# Patient Record
Sex: FEMALE | Race: BLACK OR AFRICAN AMERICAN | Employment: UNEMPLOYED | URBAN - METROPOLITAN AREA
[De-identification: names, ages, dates, MRNs, and addresses within clinical notes are randomized per-mention and may not be internally consistent; named-entity substitution may affect disease eponyms.]

---

## 2023-03-03 ENCOUNTER — APPOINTMENT (OUTPATIENT)
Dept: GENERAL RADIOLOGY | Age: 73
DRG: 502 | End: 2023-03-03
Payer: MEDICARE

## 2023-03-03 ENCOUNTER — HOSPITAL ENCOUNTER (INPATIENT)
Age: 73
LOS: 5 days | Discharge: NURSING, REHAB OR LONG-TERM CARE | DRG: 502 | End: 2023-03-08
Attending: EMERGENCY MEDICINE | Admitting: PEDIATRICS
Payer: MEDICARE

## 2023-03-03 DIAGNOSIS — S89.91XA INJURY OF RIGHT LOWER EXTREMITY, INITIAL ENCOUNTER: ICD-10-CM
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
## 2023-03-03 DIAGNOSIS — S76.111A QUADRICEPS TENDON RUPTURE, RIGHT, INITIAL ENCOUNTER: ICD-10-CM

## 2023-03-03 PROBLEM — M79.606 LEG PAIN: Status: ACTIVE | Noted: 2023-03-03

## 2023-03-03 LAB
A/G RATIO: 1.5 (ref 1.1–2.2)
ALBUMIN SERPL-MCNC: 4.5 G/DL (ref 3.4–5)
ALP BLD-CCNC: 72 U/L (ref 40–129)
ALT SERPL-CCNC: 12 U/L (ref 10–40)
ANION GAP SERPL CALCULATED.3IONS-SCNC: 10 MMOL/L (ref 3–16)
AST SERPL-CCNC: 25 U/L (ref 15–37)
BASOPHILS ABSOLUTE: 0 K/UL (ref 0–0.2)
BASOPHILS RELATIVE PERCENT: 0.2 %
BILIRUB SERPL-MCNC: 0.4 MG/DL (ref 0–1)
BUN BLDV-MCNC: 15 MG/DL (ref 7–20)
CALCIUM SERPL-MCNC: 9.8 MG/DL (ref 8.3–10.6)
CHLORIDE BLD-SCNC: 99 MMOL/L (ref 99–110)
CO2: 28 MMOL/L (ref 21–32)
CREAT SERPL-MCNC: 0.7 MG/DL (ref 0.6–1.2)
EOSINOPHILS ABSOLUTE: 0 K/UL (ref 0–0.6)
EOSINOPHILS RELATIVE PERCENT: 0.1 %
GFR SERPL CREATININE-BSD FRML MDRD: >60 ML/MIN/{1.73_M2}
GLUCOSE BLD-MCNC: 116 MG/DL (ref 70–99)
HCT VFR BLD CALC: 42 % (ref 36–48)
HEMOGLOBIN: 13.8 G/DL (ref 12–16)
LYMPHOCYTES ABSOLUTE: 0.9 K/UL (ref 1–5.1)
LYMPHOCYTES RELATIVE PERCENT: 6.4 %
MCH RBC QN AUTO: 29.7 PG (ref 26–34)
MCHC RBC AUTO-ENTMCNC: 32.9 G/DL (ref 31–36)
MCV RBC AUTO: 90.4 FL (ref 80–100)
MONOCYTES ABSOLUTE: 0.6 K/UL (ref 0–1.3)
MONOCYTES RELATIVE PERCENT: 3.9 %
NEUTROPHILS ABSOLUTE: 13 K/UL (ref 1.7–7.7)
NEUTROPHILS RELATIVE PERCENT: 89.4 %
PDW BLD-RTO: 14.4 % (ref 12.4–15.4)
PLATELET # BLD: 275 K/UL (ref 135–450)
PMV BLD AUTO: 7.8 FL (ref 5–10.5)
POTASSIUM REFLEX MAGNESIUM: 4 MMOL/L (ref 3.5–5.1)
RBC # BLD: 4.64 M/UL (ref 4–5.2)
SODIUM BLD-SCNC: 137 MMOL/L (ref 136–145)
TOTAL PROTEIN: 7.6 G/DL (ref 6.4–8.2)
WBC # BLD: 14.6 K/UL (ref 4–11)

## 2023-03-03 PROCEDURE — 36415 COLL VENOUS BLD VENIPUNCTURE: CPT

## 2023-03-03 PROCEDURE — 99285 EMERGENCY DEPT VISIT HI MDM: CPT

## 2023-03-03 PROCEDURE — 80053 COMPREHEN METABOLIC PANEL: CPT

## 2023-03-03 PROCEDURE — 6370000000 HC RX 637 (ALT 250 FOR IP): Performed by: EMERGENCY MEDICINE

## 2023-03-03 PROCEDURE — 1200000000 HC SEMI PRIVATE

## 2023-03-03 PROCEDURE — 85025 COMPLETE CBC W/AUTO DIFF WBC: CPT

## 2023-03-03 PROCEDURE — 73552 X-RAY EXAM OF FEMUR 2/>: CPT

## 2023-03-03 RX ORDER — SODIUM CHLORIDE 9 MG/ML
INJECTION, SOLUTION INTRAVENOUS PRN
Status: DISCONTINUED | OUTPATIENT
Start: 2023-03-03 | End: 2023-03-08 | Stop reason: HOSPADM

## 2023-03-03 RX ORDER — ACETAMINOPHEN 500 MG
1000 TABLET ORAL ONCE
Status: COMPLETED | OUTPATIENT
Start: 2023-03-03 | End: 2023-03-03

## 2023-03-03 RX ORDER — ENOXAPARIN SODIUM 100 MG/ML
40 INJECTION SUBCUTANEOUS DAILY
Status: DISCONTINUED | OUTPATIENT
Start: 2023-03-04 | End: 2023-03-04

## 2023-03-03 RX ORDER — OXYCODONE HYDROCHLORIDE 5 MG/1
5 TABLET ORAL EVERY 4 HOURS PRN
Status: DISCONTINUED | OUTPATIENT
Start: 2023-03-03 | End: 2023-03-08 | Stop reason: HOSPADM

## 2023-03-03 RX ORDER — SODIUM CHLORIDE 0.9 % (FLUSH) 0.9 %
5-40 SYRINGE (ML) INJECTION PRN
Status: DISCONTINUED | OUTPATIENT
Start: 2023-03-03 | End: 2023-03-08 | Stop reason: HOSPADM

## 2023-03-03 RX ORDER — ACETAMINOPHEN 325 MG/1
650 TABLET ORAL EVERY 6 HOURS PRN
Status: DISCONTINUED | OUTPATIENT
Start: 2023-03-03 | End: 2023-03-04

## 2023-03-03 RX ORDER — POLYETHYLENE GLYCOL 3350 17 G/17G
17 POWDER, FOR SOLUTION ORAL DAILY PRN
Status: DISCONTINUED | OUTPATIENT
Start: 2023-03-03 | End: 2023-03-08 | Stop reason: HOSPADM

## 2023-03-03 RX ORDER — ONDANSETRON 4 MG/1
4 TABLET, ORALLY DISINTEGRATING ORAL EVERY 8 HOURS PRN
Status: DISCONTINUED | OUTPATIENT
Start: 2023-03-03 | End: 2023-03-08 | Stop reason: HOSPADM

## 2023-03-03 RX ORDER — ONDANSETRON 2 MG/ML
4 INJECTION INTRAMUSCULAR; INTRAVENOUS EVERY 6 HOURS PRN
Status: DISCONTINUED | OUTPATIENT
Start: 2023-03-03 | End: 2023-03-08 | Stop reason: HOSPADM

## 2023-03-03 RX ORDER — SODIUM CHLORIDE 0.9 % (FLUSH) 0.9 %
5-40 SYRINGE (ML) INJECTION EVERY 12 HOURS SCHEDULED
Status: DISCONTINUED | OUTPATIENT
Start: 2023-03-04 | End: 2023-03-08 | Stop reason: HOSPADM

## 2023-03-03 RX ORDER — ACETAMINOPHEN 650 MG/1
650 SUPPOSITORY RECTAL EVERY 6 HOURS PRN
Status: DISCONTINUED | OUTPATIENT
Start: 2023-03-03 | End: 2023-03-04

## 2023-03-03 RX ADMIN — ACETAMINOPHEN 1000 MG: 500 TABLET ORAL at 21:08

## 2023-03-03 RX ADMIN — IBUPROFEN 600 MG: 400 TABLET ORAL at 21:09

## 2023-03-03 ASSESSMENT — ENCOUNTER SYMPTOMS
EYE PAIN: 0
COUGH: 0
WHEEZING: 0
SHORTNESS OF BREATH: 0
DIARRHEA: 0
BACK PAIN: 0
RHINORRHEA: 0
ABDOMINAL PAIN: 0
EYE DISCHARGE: 0
SORE THROAT: 0
VOMITING: 0
NAUSEA: 0

## 2023-03-03 ASSESSMENT — PAIN SCALES - GENERAL
PAINLEVEL_OUTOF10: 9
PAINLEVEL_OUTOF10: 9

## 2023-03-04 ENCOUNTER — APPOINTMENT (OUTPATIENT)
Dept: CT IMAGING | Age: 73
DRG: 502 | End: 2023-03-04
Payer: MEDICARE

## 2023-03-04 PROBLEM — S76.111A QUADRICEPS TENDON RUPTURE, RIGHT, INITIAL ENCOUNTER: Status: ACTIVE | Noted: 2023-03-04

## 2023-03-04 PROCEDURE — 94760 N-INVAS EAR/PLS OXIMETRY 1: CPT

## 2023-03-04 PROCEDURE — 6370000000 HC RX 637 (ALT 250 FOR IP): Performed by: INTERNAL MEDICINE

## 2023-03-04 PROCEDURE — 6370000000 HC RX 637 (ALT 250 FOR IP): Performed by: PEDIATRICS

## 2023-03-04 PROCEDURE — 73700 CT LOWER EXTREMITY W/O DYE: CPT

## 2023-03-04 PROCEDURE — 6360000002 HC RX W HCPCS: Performed by: PEDIATRICS

## 2023-03-04 PROCEDURE — 70450 CT HEAD/BRAIN W/O DYE: CPT

## 2023-03-04 PROCEDURE — 1200000000 HC SEMI PRIVATE

## 2023-03-04 PROCEDURE — 2580000003 HC RX 258: Performed by: PEDIATRICS

## 2023-03-04 RX ORDER — ACETAMINOPHEN 500 MG
1000 TABLET ORAL EVERY 8 HOURS SCHEDULED
Status: DISCONTINUED | OUTPATIENT
Start: 2023-03-04 | End: 2023-03-05

## 2023-03-04 RX ORDER — KETOROLAC TROMETHAMINE 15 MG/ML
15 INJECTION, SOLUTION INTRAMUSCULAR; INTRAVENOUS EVERY 6 HOURS PRN
Status: DISCONTINUED | OUTPATIENT
Start: 2023-03-04 | End: 2023-03-04

## 2023-03-04 RX ORDER — LISINOPRIL 20 MG/1
20 TABLET ORAL DAILY
Status: ON HOLD | COMMUNITY
End: 2023-03-08 | Stop reason: HOSPADM

## 2023-03-04 RX ORDER — LABETALOL HYDROCHLORIDE 5 MG/ML
10 INJECTION, SOLUTION INTRAVENOUS EVERY 4 HOURS PRN
Status: DISCONTINUED | OUTPATIENT
Start: 2023-03-04 | End: 2023-03-04

## 2023-03-04 RX ORDER — PANTOPRAZOLE SODIUM 40 MG/1
40 TABLET, DELAYED RELEASE ORAL
Status: DISCONTINUED | OUTPATIENT
Start: 2023-03-04 | End: 2023-03-08 | Stop reason: HOSPADM

## 2023-03-04 RX ORDER — METOPROLOL SUCCINATE 50 MG/1
TABLET, EXTENDED RELEASE ORAL DAILY
COMMUNITY

## 2023-03-04 RX ORDER — LIDOCAINE 4 G/G
1 PATCH TOPICAL DAILY
Status: DISCONTINUED | OUTPATIENT
Start: 2023-03-04 | End: 2023-03-08 | Stop reason: HOSPADM

## 2023-03-04 RX ORDER — SIMVASTATIN 20 MG
20 TABLET ORAL NIGHTLY
COMMUNITY

## 2023-03-04 RX ORDER — METHOCARBAMOL 500 MG/1
500 TABLET, FILM COATED ORAL 4 TIMES DAILY PRN
Status: DISCONTINUED | OUTPATIENT
Start: 2023-03-04 | End: 2023-03-08 | Stop reason: HOSPADM

## 2023-03-04 RX ORDER — METOPROLOL SUCCINATE 50 MG/1
50 TABLET, EXTENDED RELEASE ORAL DAILY
Status: DISCONTINUED | OUTPATIENT
Start: 2023-03-04 | End: 2023-03-05

## 2023-03-04 RX ORDER — MORPHINE SULFATE 2 MG/ML
2 INJECTION, SOLUTION INTRAMUSCULAR; INTRAVENOUS EVERY 4 HOURS PRN
Status: DISCONTINUED | OUTPATIENT
Start: 2023-03-04 | End: 2023-03-08 | Stop reason: HOSPADM

## 2023-03-04 RX ORDER — ATORVASTATIN CALCIUM 10 MG/1
10 TABLET, FILM COATED ORAL DAILY
Status: DISCONTINUED | OUTPATIENT
Start: 2023-03-04 | End: 2023-03-08 | Stop reason: HOSPADM

## 2023-03-04 RX ORDER — LISINOPRIL 20 MG/1
20 TABLET ORAL DAILY
Status: DISCONTINUED | OUTPATIENT
Start: 2023-03-04 | End: 2023-03-08 | Stop reason: HOSPADM

## 2023-03-04 RX ADMIN — ATORVASTATIN CALCIUM 10 MG: 10 TABLET, FILM COATED ORAL at 09:02

## 2023-03-04 RX ADMIN — ENOXAPARIN SODIUM 40 MG: 100 INJECTION SUBCUTANEOUS at 09:02

## 2023-03-04 RX ADMIN — ACETAMINOPHEN 1000 MG: 500 TABLET ORAL at 14:30

## 2023-03-04 RX ADMIN — ACETAMINOPHEN 1000 MG: 500 TABLET ORAL at 21:05

## 2023-03-04 RX ADMIN — SODIUM CHLORIDE, PRESERVATIVE FREE 10 ML: 5 INJECTION INTRAVENOUS at 09:03

## 2023-03-04 RX ADMIN — ACETAMINOPHEN 1000 MG: 500 TABLET ORAL at 09:05

## 2023-03-04 RX ADMIN — OXYCODONE HYDROCHLORIDE 5 MG: 5 TABLET ORAL at 00:21

## 2023-03-04 RX ADMIN — SODIUM CHLORIDE, PRESERVATIVE FREE 10 ML: 5 INJECTION INTRAVENOUS at 21:04

## 2023-03-04 ASSESSMENT — PAIN SCALES - GENERAL
PAINLEVEL_OUTOF10: 4
PAINLEVEL_OUTOF10: 8
PAINLEVEL_OUTOF10: 6
PAINLEVEL_OUTOF10: 6
PAINLEVEL_OUTOF10: 4

## 2023-03-04 ASSESSMENT — PAIN - FUNCTIONAL ASSESSMENT
PAIN_FUNCTIONAL_ASSESSMENT: PREVENTS OR INTERFERES SOME ACTIVE ACTIVITIES AND ADLS
PAIN_FUNCTIONAL_ASSESSMENT: PREVENTS OR INTERFERES SOME ACTIVE ACTIVITIES AND ADLS

## 2023-03-04 ASSESSMENT — PAIN DESCRIPTION - ORIENTATION
ORIENTATION: RIGHT
ORIENTATION: RIGHT

## 2023-03-04 ASSESSMENT — PAIN DESCRIPTION - DESCRIPTORS
DESCRIPTORS: THROBBING
DESCRIPTORS: DISCOMFORT

## 2023-03-04 ASSESSMENT — PAIN DESCRIPTION - PAIN TYPE: TYPE: ACUTE PAIN

## 2023-03-04 ASSESSMENT — PAIN DESCRIPTION - FREQUENCY: FREQUENCY: CONTINUOUS

## 2023-03-04 ASSESSMENT — PAIN DESCRIPTION - LOCATION
LOCATION: LEG
LOCATION: LEG

## 2023-03-04 ASSESSMENT — PAIN DESCRIPTION - ONSET: ONSET: ON-GOING

## 2023-03-04 NOTE — PROGRESS NOTES
Pt admitted to 4133 via stretcher. Pt is A&O x 4, pleasant and cooperative. Pt has limited movement to right leg with pain and swelling in her knee. Ace wrap  readjusted to right thigh. Pt oriented to room, call light, fall precautions and POC. Pt c/o throbbing pain rating 8/10. Oxycodone given per PRN order. Pt given dylon crackers per request. Call light and needs in reach.      Electronically signed by Stella John RN on 3/4/2023 at 5:23 AM

## 2023-03-04 NOTE — ED NOTES
Report called to 4W RN, Alecia, to assume care. All questions and concerns answered.       Naveen Davila RN  03/03/23 0515

## 2023-03-04 NOTE — ED PROVIDER NOTES
[address] [facility]        Pt Name: Violette Mendez  MRN: [id number]  [place] 1950  Date of evaluation: 3/3/2023  Provider: Sharon Greenwood MD  PCP: No primary care provider on file. Note Started: 8:49 PM EST 3/3/23    CHIEF COMPLAINT       Chief Complaint   Patient presents with    Fall     Pt fell around 31 [phone] 62 this evening       HISTORY OF PRESENT ILLNESS: 1 or more Elements             Violette Mendez is a 74 y.o. female  has no past medical history on file. who presents with right leg pain. Patient went outside to  her recycling can. It was wet. She slipped and fell injuring her right leg. She was unable to walk on her own. Pain is primarily along the distal lateral aspect of her right thigh. No head neck chest or abdomen injury. She is not on any blood thinners    Nursing Notes were all reviewed and agreed with or any disagreements were addressed in the HPI. REVIEW OF SYSTEMS :      Review of Systems   Constitutional:  Negative for chills, fatigue and fever. HENT:  Negative for ear pain, rhinorrhea and sore throat. Eyes:  Negative for pain, discharge and visual disturbance. Respiratory:  Negative for cough, shortness of breath and wheezing. Cardiovascular:  Negative for chest pain, palpitations and leg swelling. Gastrointestinal:  Negative for abdominal pain, diarrhea, nausea and vomiting. Genitourinary:  Negative for difficulty urinating, dysuria, pelvic pain and vaginal discharge. Musculoskeletal:  Negative for arthralgias, back pain, joint swelling and neck pain. Positive per HPI   Skin:  Negative for rash. Allergic/Immunologic: Negative for environmental allergies. Neurological:  Negative for dizziness, seizures, syncope and headaches. Hematological:  Negative for adenopathy. Psychiatric/Behavioral:  Negative for dysphoric mood and suicidal ideas.  The patient is not nervous/anxious. Positives and Pertinent negatives as per HPI. PAST MEDICAL HISTORY      has no past medical history on file. SURGICAL HISTORY   History reviewed. No pertinent surgical history. CURRENTMEDICATIONS       Previous Medications    No medications on file       ALLERGIES     Patient has no known allergies. FAMILYHISTORY     History reviewed. No pertinent family history. SOCIAL HISTORY       Social History     Tobacco Use    Smoking status: Never    Smokeless tobacco: Never   Vaping Use    Vaping Use: Never used   Substance Use Topics    Alcohol use: Not Currently    Drug use: Never       SCREENINGS        [place] Coma Scale  Eye Opening: Spontaneous  Best Verbal Response: Oriented  Best Motor Response: Obeys commands  [place] Coma Scale Score: 15                CIWA Assessment  BP: 122/60  Heart Rate: 83           PHYSICAL EXAM  1 or more Elements     ED Triage Vitals [03/03/23 2041]   BP Temp Temp Source Heart Rate Resp SpO2 Height Weight   111/66 98 °F (36.7 °C) Oral 84 17 95 % 5' 1\" (1.549 m) 187 lb 2.7 oz (84.9 kg)       Physical Exam  Vitals and nursing note reviewed. Constitutional:       Appearance: She is well-developed. She is not diaphoretic. HENT:      Head: Normocephalic and atraumatic. Right Ear: External ear normal.      Left Ear: External ear normal.   Eyes:      General: No scleral icterus. Right eye: No discharge. Left eye: No discharge. Conjunctiva/sclera: Conjunctivae normal.   Neck:      Trachea: No tracheal deviation. Cardiovascular:      Heart sounds: Normal heart sounds. Pulmonary:      Effort: Pulmonary effort is normal. No respiratory distress. Breath sounds: No stridor. Musculoskeletal:         General: Normal range of motion. Cervical back: Normal range of motion. Comments: She actually does not have pain in the hip. Her pain is primarily along the distal anterior and lateral right thigh.   No pain in the pelvic bone. She has some osteoarthritic changes in the knee but its not there that she hurts. No pain along the tibia and foot and ankle. Skin:     General: Skin is warm and dry. Neurological:      General: No focal deficit present. Mental Status: She is alert and oriented to person, place, and time. Coordination: Coordination normal.   Psychiatric:         Behavior: Behavior normal.           DIAGNOSTIC RESULTS   LABS:    Results for orders placed or performed during the hospital encounter of 03/03/23   CBC with Auto Differential   Result Value Ref Range    WBC 14.6 (H) 4.0 - 11.0 K/uL    RBC 4.64 4.00 - 5.20 M/uL    Hemoglobin 13.8 12.0 - 16.0 g/dL    Hematocrit 42.0 36.0 - 48.0 %    MCV 90.4 80.0 - 100.0 fL    MCH 29.7 26.0 - 34.0 pg    MCHC 32.9 31.0 - 36.0 g/dL    RDW 14.4 12.4 - 15.4 %    Platelets 232 355 - 118 K/uL    MPV 7.8 5.0 - 10.5 fL    Neutrophils % 89.4 %    Lymphocytes % 6.4 %    Monocytes % 3.9 %    Eosinophils % 0.1 %    Basophils % 0.2 %    Neutrophils Absolute 13.0 (H) 1.7 - 7.7 K/uL    Lymphocytes Absolute 0.9 (L) 1.0 - 5.1 K/uL    Monocytes Absolute 0.6 0.0 - 1.3 K/uL    Eosinophils Absolute 0.0 0.0 - 0.6 K/uL    Basophils Absolute 0.0 0.0 - 0.2 K/uL   CMP w/ Reflex to MG   Result Value Ref Range    Sodium 137 136 - 145 mmol/L    Potassium reflex Magnesium 4.0 3.5 - 5.1 mmol/L    Chloride 99 99 - 110 mmol/L    CO2 28 21 - 32 mmol/L    Anion Gap 10 3 - 16    Glucose 116 (H) 70 - 99 mg/dL    BUN 15 7 - 20 mg/dL    Creatinine 0.7 0.6 - 1.2 mg/dL    Est, Glom Filt Rate >60 >60    Calcium 9.8 8.3 - 10.6 mg/dL    Total Protein 7.6 6.4 - 8.2 g/dL    Albumin 4.5 3.4 - 5.0 g/dL    Albumin/Globulin Ratio 1.5 1.1 - 2.2    Total Bilirubin 0.4 0.0 - 1.0 mg/dL    Alkaline Phosphatase 72 40 - 129 U/L    ALT 12 10 - 40 U/L    AST 25 15 - 37 U/L       When ordered only abnormal lab results are displayed. All other labs were within normal range or not returned as of this dictation.     EKG: None    RADIOLOGY:   Non-plain film images such as CT, Ultrasound and MRI are read by the radiologist. Plain radiographic images are visualized and preliminarily interpreted by the ED Provider with the below findings:      4 views of the right femur are obtained. These are visualized and interpreted by myself to be negative for signs of fracture. There is some soft tissue swelling. XR FEMUR RIGHT (MIN 2 VIEWS)    Result Date: 3/3/2023  EXAMINATION: 2 XRAY VIEWS OF THE RIGHT FEMUR 3/3/2023 8:54 pm COMPARISON: None. HISTORY: ORDERING SYSTEM PROVIDED HISTORY: fall injury. Pain mostly in distal half of anterior and lateral thigh. TECHNOLOGIST PROVIDED HISTORY: Reason for exam:->fall injury. Pain mostly in distal half of anterior and lateral thigh. Reason for Exam: fall injury. Pain mostly in distal half of anterior and lateral thigh. FINDINGS: Negative for fracture or dislocation. Osteoarthritic changes of the knee. Mild osteophytes of the acetabulum. New large spurs the patellofemoral space. Mild soft tissue thickening at the quadriceps insertion. However, negative for patella baja. Negative for joint effusion. No fracture. Mild soft tissue thickening of the quadriceps. Interpretation per the Radiologist below, if available at the time of this note:    XR FEMUR RIGHT (MIN 2 VIEWS)   Final Result   No fracture. Mild soft tissue thickening of the quadriceps. No results found. No results found.     PROCEDURES   Unless otherwise noted below, none     Procedures    CRITICAL CARE TIME   N/A    EMERGENCY DEPARTMENT COURSE and DIFFERENTIAL DIAGNOSIS/MDM:   Vitals:    Vitals:    03/03/23 2115 03/03/23 2145 03/03/23 2200 03/03/23 2245   BP: 104/66 121/71 108/74 122/60   Pulse: 91 90 85 83   Resp: 16 17 21 18   Temp:       TempSrc:       SpO2: 96% 95% 92% 93%   Weight:       Height:           Patient was given the following medications:  Medications   acetaminophen (TYLENOL) tablet 1,000 mg (1,000 mg Oral Given 3/3/23 2108)   ibuprofen (ADVIL;MOTRIN) tablet 600 mg (600 mg Oral Given 3/3/23 2109)             Is this patient to be included in the SEP-1 Core Measure due to severe sepsis or septic shock? No   Exclusion criteria - the patient is NOT to be included for SEP-1 Core Measure due to: Infection is not suspected    CONSULTS: (Who and What was discussed)  None                CC/HPI Summary, DDx, ED Course, and Reassessment: I suspect she has a hematoma in her thigh or a muscle tear. She really does not have any pain in the knee or the hip. The pain is all primarily in the distal half of the femur. We did see if we could get the patient out of bed and walk but she simply could not even get out of the bed for us to try to stand her up so I requested inpatient care. Disposition Considerations (tests considered but not done, Shared Decision Making, Pt Expectation of Test or Tx.): As above        I am the Primary Clinician of Record. FINAL IMPRESSION      1. Fall, initial encounter    2. Injury of right lower extremity, initial encounter          DISPOSITION/PLAN     DISPOSITION Admitted 03/03/2023 11:23:34 PM      PATIENT REFERRED TO:  No follow-up provider specified.     DISCHARGE MEDICATIONS:  New Prescriptions    No medications on file       DISCONTINUED MEDICATIONS:  Discontinued Medications    No medications on file              (Please note that portions of this note were completed with a voice recognition program.  Efforts were made to edit the dictations but occasionally words are mis-transcribed.)    Stephanie Oconnor MD (electronically signed)            Stephanie Oconnor MD  03/03/23 1408

## 2023-03-04 NOTE — H&P
Hospital Medicine History & Physical      PCP: No primary care provider on file. Date of Admission: 3/3/2023    Date of Service: Pt seen/examined on 03/04/23  and Admitted to Inpatient with expected LOS greater than two midnights due to medical therapy. Chief Complaint:  right leg pain       History Of Present Illness:       74 y.o. female who presented to [facility] -     She reports that the wind blew over the trash cans in her back yard. She went out to try to clean up the mess when she slipped on the wet grass and fell. She reports that her right leg bent backwards. She subsequently experienced pain. She reports she crawled back to the house to get to the phone and called her son. Her son tried to get her up but she was not able to weight bear on her right leg, so they called 911 and she was brought to the ED. ED staff tried to get her up and walking but was not successful. So she was admitted. SocHx:   - does not smoke   - does not drink   -    - has 3 children     Past Medical History:          Diagnosis Date    Hyperlipidemia     Hypertension        Past Surgical History:      History reviewed. No pertinent surgical history. Medications Prior to Admission:      Prior to Admission medications    Medication Sig Start Date End Date Taking? Authorizing Provider   simvastatin (ZOCOR) 20 MG tablet Take 20 mg by mouth nightly   Yes Historical Provider, MD   metoprolol succinate (TOPROL XL) 50 MG extended release tablet Take by mouth daily   Yes Historical Provider, MD   lisinopril (PRINIVIL;ZESTRIL) 20 MG tablet Take 20 mg by mouth daily   Yes Historical Provider, MD       Allergies:  Patient has no known allergies. Social History:      The patient currently lives  at home     TOBACCO:   reports that she has never smoked. She has never used smokeless tobacco.  ETOH:   reports that she does not currently use alcohol.   E-cigarette/Vaping       Questions Responses Please let her know chest ct showed a common finding: small nodule that needs a gayle 3 months (order in for you to schedule)   E-cigarette/Vaping Use Never User    Start Date     Passive Exposure     Quit Date     Counseling Given     Comments               Family History:        Reviewed and negative in regards to presenting illness/complaint. History reviewed. No pertinent family history. REVIEW OF SYSTEMS COMPLETED:   Pertinent positives as noted in the HPI. All other systems reviewed and negative. PHYSICAL EXAM PERFORMED:    /64   Pulse 71   Temp 98.2 °F (36.8 °C) (Oral)   Resp 18   Ht 5' 1\" (1.549 m)   Wt 176 lb 9.4 oz (80.1 kg)   SpO2 94%   BMI 33.37 kg/m²     General appearance:  No apparent distress, appears stated age and cooperative. HEENT:  Normal cephalic, atraumatic without obvious deformity. Pupils equal, round, and reactive to light. Extra ocular muscles intact. Conjunctivae/corneas clear. Neck: Supple, with full range of motion. No jugular venous distention. Trachea midline. Respiratory:  Normal respiratory effort. Clear to auscultation, bilaterally without Rales/Wheezes/Rhonchi. Cardiovascular:  Regular rate and rhythm with normal S1/S2 without murmurs, rubs or gallops. Abdomen: Soft, non-tender, non-distended with normal bowel sounds. Musculoskeletal:  No clubbing, cyanosis or edema bilaterally. Full range of motion without deformity. Skin: Skin color, texture, turgor normal.  No rashes or lesions. Neurologic:  Neurovascularly intact without any focal sensory/motor deficits.  Cranial nerves: II-XII intact, grossly non-focal.  Psychiatric:  Alert and oriented, thought content appropriate, normal insight  Capillary Refill: Brisk,3 seconds, normal  Peripheral Pulses: +2 palpable, equal bilaterally       Labs:     Recent Labs     03/03/23 2235   WBC 14.6*   HGB 13.8   HCT 42.0        Recent Labs     03/03/23 2233      K 4.0   CL 99   CO2 28   BUN 15   CREATININE 0.7   CALCIUM 9.8     Recent Labs     03/03/23 2233   AST 25   ALT 12   BILITOT 0.4   ALKPHOS 72     No results for input(s): INR in the last 72 hours. No results for input(s): Mariela Horn in the last 72 hours. Urinalysis:    No results found for: Cruz Jean, BACTERIA, [address] [facility], [address], [place] [facility] [address]    Radiology:     CXR: I have reviewed the CXR with the following interpretation:    EKG:  I have reviewed the EKG with the following interpretation:      XR FEMUR RIGHT (MIN 2 VIEWS)   Final Result   No fracture. Mild soft tissue thickening of the quadriceps. CT FEMUR RIGHT WO CONTRAST    (Results Pending)   CT HEAD WO CONTRAST    (Results Pending)       Consults:    IP CONSULT TO SOCIAL WORK    ASSESSMENT:    Active Hospital Problems    Diagnosis Date Noted    Leg pain [M79.606] 03/03/2023     Priority: Kenya Krueger is a 74 y.o. female     R leg pain s/p fall:   - ongoing point tenderness over her right hip, and some weakness in her right leg (due to associated pain)   - acetaminophen prn pain 1st line   - oxycodone prn pain 2nd line   - PT/OT consulted   - SW consult   - robaxin QID PRN muscle spasms     Hypertension:   - lisinopril 02 mg po daily     Dyslipidemia:   - cont statin     Tachycardia:   - metoprolol 50 mg po daily       DVT Prophylaxis: enoxaparin   Diet: ADULT DIET; Regular  Code Status: Full Code  - alternative decision maker - tiana Harper     PT/OT Eval Status: consulted     Dispo - likely placement          Tatianna Hess MD    Thank you No primary care provider on file. for the opportunity to be involved in this patient's care.

## 2023-03-04 NOTE — PLAN OF CARE
Problem: Discharge Planning  Goal: Discharge to home or other facility with appropriate resources  Outcome: Progressing  Flowsheets (Taken 3/4/2023 0013)  Discharge to home or other facility with appropriate resources: Identify barriers to discharge with patient and caregiver     Problem: Safety - Adult  Goal: Free from fall injury  Outcome: Progressing     Problem: Pain  Goal: Verbalizes/displays adequate comfort level or baseline comfort level  Outcome: Progressing     Problem: Musculoskeletal - Adult  Goal: Return mobility to safest level of function  Outcome: Progressing  Goal: Maintain proper alignment of affected body part  Outcome: Progressing  Goal: Return ADL status to a safe level of function  Outcome: Progressing 53.5

## 2023-03-04 NOTE — PROGRESS NOTES
4 Eyes Skin Assessment     NAME:  Shelly Estrada  YOB: 1950  MEDICAL RECORD NUMBER:  [id number]    The patient is being assessed for  Admission    I agree that One RN has performed a thorough Head to Toe Skin Assessment on the patient. ALL assessment sites listed below have been assessed. Areas assessed by both nurses:    Head, Face, Ears, Shoulders, Back, Chest, Arms, Elbows, Hands, Sacrum. Buttock, Coccyx, Ischium, and Legs. Feet and Heels        Does the Patient have a Wound?  No noted wound(s)       Edgar Prevention initiated by RN: No   Wound Care Orders initiated by RN: No    Pressure Injury (Stage 3,4, Unstageable, DTI, NWPT, and Complex wounds) if present, place referral order by RN under : No    New and Established Ostomies, if present place, referral order under : No      Nurse 1 eSignature: Electronically signed by Mohan Sterling RN on 3/4/23 at 6:23 AM EST    **SHARE this note so that the co-signing nurse can place an eSignature**    Nurse 2 eSignature: Electronically signed by Luisa Ervin RN on 3/4/23 at 6:23 AM EST

## 2023-03-04 NOTE — PROGRESS NOTES
Physical Therapy  PT hold  Shelly Estrada  Received orders for PT eval and treat. Pt with right quadriceps tendon rupture. Will await ortho consult and WB status prior to initiating PT.     Electronically signed by Andrew Gray, PT [id number] on 3/4/2023 at 12:04 PM

## 2023-03-04 NOTE — PROGRESS NOTES
Occupational Therapy Attempt  Shelly Estrada    OT order noted and chart reviewed. Pt noted to have right quadriceps tendon rupture and is awaiting an orthopedic surgery consult. Will hold and await further recommendations from ortho before proceeding w/ therapy.    Electronically signed by Lilliam Keys OT on 3/4/23 at 12:03 PM EST

## 2023-03-04 NOTE — PROGRESS NOTES
Hospital Medicine Progress Note     Date:  3/4/2023    PCP: No primary care provider on file. (Tel: None)    Date of Admission: 3/3/2023    Chief complaint:   Chief Complaint   Patient presents with    Fall     Pt fell around 1830 this evening       Brief admission history: 68-year-old female with history of essential hypertension, hyperlipidemia, obesity with BMI of 33 kg/m², who presents to the emergency room with complaints of fall following a sleep, sustained right leg injury that was complicated by pain and difficulty with ambulation. In the emergency room, she had x-ray-right femur, that was unremarkable for fracture; revealed mild soft tissue thickening of the quadriceps. She reportedly denies hitting her head. Assessment/plan:  Right quadriceps tendon rupture. Patient with right leg pain secondary to fall. She reports persistent right leg pain following fall (at home), with no evidence of fracture on x-ray-right femur. CT-right femur revealed right quadriceps tendon rupture with associated hematoma. Continue multimodal pain regimen. Orthopedic surgery has been consulted to assist with evaluation. Fall precautions. No anticoagulation for now due to associated hematoma. Fall at home, initial encounter. This appears to be mechanical in nature, as patient slipped. Patient denies hitting her head. Will obtain CT-head without contrast to rule out any intracranial pathology. Fall precautions. Therapy evaluations as noted above. Leukocytosis, present on admission. Likely reactive. No fever or evidence of infectious process. Monitor white count closely. Other comorbidities: history of essential hypertension, hyperlipidemia, obesity with BMI of 33 kg/m². Diet: ADULT DIET; Regular    Code status: Full Code   ----------  Subjective  Right thigh pain, difficulty with flexion and extension.     Objective  Physical exam:  Vitals: /64   Pulse 71   Temp 98.2 °F (36.8 °C) (Oral)   Resp 18   Ht 5' 1\" (1.549 m)   Wt 176 lb 9.4 oz (80.1 kg)   SpO2 94%   BMI 33.37 kg/m²   Gen/overall appearance: Not in acute distress. Alert. Oriented x3. Head: Normocephalic, atraumatic  Eyes: EOMI, good acuity  ENT: Oral mucosa moist  Neck: No JVD, thyromegaly  CVS: Nml S1S2, no MRG, RRR  Pulm: Clear bilaterally. No crackles/wheezes  Gastrointestinal: Soft, NT/ND, +BS  Musculoskeletal: Massive swelling right knee. Unable to flex or extend right knee beyond about a 15 degree angle. Neuro: No focal deficit. Moves extremity spontaneously. Psychiatry: Appropriate affect. Not agitated. Skin: Warm, dry with normal turgor. No rash  Capillary refill: Brisk,< 3 seconds   Peripheral Pulses: +2 palpable, equal bilaterally      24HR INTAKE/OUTPUT:    Intake/Output Summary (Last 24 hours) at 3/4/2023 0808  Last data filed at 3/4/2023 0012  Gross per 24 hour   Intake 240 ml   Output --   Net 240 ml     I/O last 3 completed shifts: In: 240 [P.O.:240]  Out: -   No intake/output data recorded.     Meds:    acetaminophen  1,000 mg Oral 3 times per day    lidocaine  1 patch TransDERmal Daily    lisinopril  20 mg Oral Daily    metoprolol succinate  50 mg Oral Daily    atorvastatin  10 mg Oral Daily    pantoprazole  40 mg Oral QAM AC    sodium chloride flush  5-40 mL IntraVENous 2 times per day    enoxaparin  40 mg SubCUTAneous Daily     Infusions:    sodium chloride       PRN Meds: morphine, labetalol, ketorolac, sodium chloride flush, sodium chloride, ondansetron **OR** ondansetron, polyethylene glycol, oxyCODONE    Labs/imaging:  CBC:   Recent Labs     03/03/23 2235   WBC 14.6*   HGB 13.8        BMP:    Recent Labs     03/03/23 2233      K 4.0   CL 99   CO2 28   BUN 15   CREATININE 0.7   GLUCOSE 116*     Hepatic:   Recent Labs     03/03/23 2233   AST 25   ALT 12   BILITOT 0.4   ALKPHOS 72       Domonique Cramer MD  -------------------------------  [facility] Hospitalist

## 2023-03-04 NOTE — ED TRIAGE NOTES
Pt presents to ED via EMS from home with a c/o fall that happened around 1830 tonight. Pt reports right leg pain. Pt rates pain 9/10. Pt states she was outside in her backyard and tripped and fell onto concrete. Pt denies LOC. Pt denies hitting head. Pt denies blood thinners. Pt denies taking any medications for pain prior to visit. Pt denies using assistive devices at home.

## 2023-03-04 NOTE — PLAN OF CARE
Problem: Discharge Planning  Goal: Discharge to home or other facility with appropriate resources  3/4/2023 1205 by Bryce eVlasco RN  Outcome: Progressing  3/4/2023 0524 by Olena Monroe RN  Outcome: Progressing  Flowsheets (Taken 3/4/2023 0013)  Discharge to home or other facility with appropriate resources: Identify barriers to discharge with patient and caregiver     Problem: Safety - Adult  Goal: Free from fall injury  3/4/2023 1205 by Bryce Velasco RN  Outcome: Progressing  3/4/2023 0524 by Olena Monroe RN  Outcome: Progressing     Problem: Pain  Goal: Verbalizes/displays adequate comfort level or baseline comfort level  3/4/2023 1205 by Bryce Velasco RN  Outcome: Progressing  3/4/2023 0524 by Olena Monroe RN  Outcome: Progressing     Problem: Musculoskeletal - Adult  Goal: Return mobility to safest level of function  3/4/2023 1205 by Bryce Velasco RN  Outcome: Progressing  3/4/2023 0524 by Olena Monroe RN  Outcome: Progressing  Goal: Maintain proper alignment of affected body part  3/4/2023 1205 by Bryce Velasco RN  Outcome: Progressing  3/4/2023 0524 by Olena Monroe RN  Outcome: Progressing  Goal: Return ADL status to a safe level of function  3/4/2023 1205 by Bryce Velasco RN  Outcome: Progressing  3/4/2023 0524 by Olena Monroe RN  Outcome: Progressing

## 2023-03-05 LAB
A/G RATIO: 1.4 (ref 1.1–2.2)
ALBUMIN SERPL-MCNC: 4.1 G/DL (ref 3.4–5)
ALP BLD-CCNC: 59 U/L (ref 40–129)
ALT SERPL-CCNC: 11 U/L (ref 10–40)
ANION GAP SERPL CALCULATED.3IONS-SCNC: 12 MMOL/L (ref 3–16)
AST SERPL-CCNC: 23 U/L (ref 15–37)
BASOPHILS ABSOLUTE: 0 K/UL (ref 0–0.2)
BASOPHILS RELATIVE PERCENT: 0.4 %
BILIRUB SERPL-MCNC: 0.7 MG/DL (ref 0–1)
BUN BLDV-MCNC: 14 MG/DL (ref 7–20)
CALCIUM SERPL-MCNC: 9.3 MG/DL (ref 8.3–10.6)
CHLORIDE BLD-SCNC: 102 MMOL/L (ref 99–110)
CO2: 25 MMOL/L (ref 21–32)
CREAT SERPL-MCNC: 0.9 MG/DL (ref 0.6–1.2)
EOSINOPHILS ABSOLUTE: 0.1 K/UL (ref 0–0.6)
EOSINOPHILS RELATIVE PERCENT: 0.7 %
GFR SERPL CREATININE-BSD FRML MDRD: >60 ML/MIN/{1.73_M2}
GLUCOSE BLD-MCNC: 109 MG/DL (ref 70–99)
HCT VFR BLD CALC: 37.8 % (ref 36–48)
HEMOGLOBIN: 12.6 G/DL (ref 12–16)
INR BLD: 1.02 (ref 0.87–1.14)
LYMPHOCYTES ABSOLUTE: 2.3 K/UL (ref 1–5.1)
LYMPHOCYTES RELATIVE PERCENT: 26.7 %
MAGNESIUM: 2.1 MG/DL (ref 1.8–2.4)
MCH RBC QN AUTO: 30.2 PG (ref 26–34)
MCHC RBC AUTO-ENTMCNC: 33.4 G/DL (ref 31–36)
MCV RBC AUTO: 90.5 FL (ref 80–100)
MONOCYTES ABSOLUTE: 0.5 K/UL (ref 0–1.3)
MONOCYTES RELATIVE PERCENT: 5.9 %
NEUTROPHILS ABSOLUTE: 5.8 K/UL (ref 1.7–7.7)
NEUTROPHILS RELATIVE PERCENT: 66.3 %
PDW BLD-RTO: 14.2 % (ref 12.4–15.4)
PHOSPHORUS: 3.1 MG/DL (ref 2.5–4.9)
PLATELET # BLD: 249 K/UL (ref 135–450)
PMV BLD AUTO: 7.7 FL (ref 5–10.5)
POTASSIUM SERPL-SCNC: 4 MMOL/L (ref 3.5–5.1)
PROTHROMBIN TIME: 13.3 SEC (ref 11.7–14.5)
RBC # BLD: 4.18 M/UL (ref 4–5.2)
SODIUM BLD-SCNC: 139 MMOL/L (ref 136–145)
TOTAL PROTEIN: 7.1 G/DL (ref 6.4–8.2)
WBC # BLD: 8.7 K/UL (ref 4–11)

## 2023-03-05 PROCEDURE — 9990000010 HC NO CHARGE VISIT

## 2023-03-05 PROCEDURE — 6370000000 HC RX 637 (ALT 250 FOR IP): Performed by: INTERNAL MEDICINE

## 2023-03-05 PROCEDURE — 85025 COMPLETE CBC W/AUTO DIFF WBC: CPT

## 2023-03-05 PROCEDURE — 36415 COLL VENOUS BLD VENIPUNCTURE: CPT

## 2023-03-05 PROCEDURE — 84100 ASSAY OF PHOSPHORUS: CPT

## 2023-03-05 PROCEDURE — 83735 ASSAY OF MAGNESIUM: CPT

## 2023-03-05 PROCEDURE — 80053 COMPREHEN METABOLIC PANEL: CPT

## 2023-03-05 PROCEDURE — 85610 PROTHROMBIN TIME: CPT

## 2023-03-05 PROCEDURE — 2580000003 HC RX 258: Performed by: PEDIATRICS

## 2023-03-05 PROCEDURE — 1200000000 HC SEMI PRIVATE

## 2023-03-05 RX ORDER — POLYETHYLENE GLYCOL 3350 17 G/17G
17 POWDER, FOR SOLUTION ORAL ONCE
Status: COMPLETED | OUTPATIENT
Start: 2023-03-05 | End: 2023-03-05

## 2023-03-05 RX ORDER — DOCUSATE SODIUM 100 MG/1
100 CAPSULE, LIQUID FILLED ORAL 2 TIMES DAILY
Status: DISCONTINUED | OUTPATIENT
Start: 2023-03-05 | End: 2023-03-08 | Stop reason: HOSPADM

## 2023-03-05 RX ORDER — METOPROLOL SUCCINATE 25 MG/1
25 TABLET, EXTENDED RELEASE ORAL DAILY
Status: DISCONTINUED | OUTPATIENT
Start: 2023-03-05 | End: 2023-03-08 | Stop reason: HOSPADM

## 2023-03-05 RX ORDER — ACETAMINOPHEN 325 MG/1
650 TABLET ORAL EVERY 6 HOURS PRN
Status: DISCONTINUED | OUTPATIENT
Start: 2023-03-05 | End: 2023-03-08 | Stop reason: HOSPADM

## 2023-03-05 RX ADMIN — METOPROLOL SUCCINATE 25 MG: 25 TABLET, EXTENDED RELEASE ORAL at 09:46

## 2023-03-05 RX ADMIN — SODIUM CHLORIDE, PRESERVATIVE FREE 10 ML: 5 INJECTION INTRAVENOUS at 08:41

## 2023-03-05 RX ADMIN — DOCUSATE SODIUM 100 MG: 100 CAPSULE, LIQUID FILLED ORAL at 09:46

## 2023-03-05 RX ADMIN — DOCUSATE SODIUM 100 MG: 100 CAPSULE, LIQUID FILLED ORAL at 20:01

## 2023-03-05 RX ADMIN — POLYETHYLENE GLYCOL 3350 17 G: 17 POWDER, FOR SOLUTION ORAL at 09:46

## 2023-03-05 RX ADMIN — ACETAMINOPHEN 650 MG: 325 TABLET ORAL at 20:01

## 2023-03-05 RX ADMIN — PANTOPRAZOLE SODIUM 40 MG: 40 TABLET, DELAYED RELEASE ORAL at 05:11

## 2023-03-05 RX ADMIN — ATORVASTATIN CALCIUM 10 MG: 10 TABLET, FILM COATED ORAL at 20:03

## 2023-03-05 RX ADMIN — SODIUM CHLORIDE, PRESERVATIVE FREE 10 ML: 5 INJECTION INTRAVENOUS at 20:01

## 2023-03-05 RX ADMIN — ACETAMINOPHEN 1000 MG: 500 TABLET ORAL at 05:10

## 2023-03-05 ASSESSMENT — PAIN SCALES - GENERAL
PAINLEVEL_OUTOF10: 1
PAINLEVEL_OUTOF10: 3

## 2023-03-05 ASSESSMENT — PAIN DESCRIPTION - DESCRIPTORS: DESCRIPTORS: ACHING;DISCOMFORT

## 2023-03-05 ASSESSMENT — PAIN DESCRIPTION - ORIENTATION
ORIENTATION: RIGHT
ORIENTATION: RIGHT

## 2023-03-05 ASSESSMENT — PAIN DESCRIPTION - LOCATION
LOCATION: LEG
LOCATION: LEG

## 2023-03-05 NOTE — PLAN OF CARE
Problem: Discharge Planning  Goal: Discharge to home or other facility with appropriate resources  Outcome: Progressing  Flowsheets (Taken 3/4/2023 2030)  Discharge to home or other facility with appropriate resources: Identify barriers to discharge with patient and caregiver     Problem: Safety - Adult  Goal: Free from fall injury  Outcome: Progressing  Flowsheets (Taken 3/5/2023 0228)  Free From Fall Injury: Instruct family/caregiver on patient safety     Problem: Pain  Goal: Verbalizes/displays adequate comfort level or baseline comfort level  Outcome: Progressing  Flowsheets (Taken 3/4/2023 2104)  Verbalizes/displays adequate comfort level or baseline comfort level: Encourage patient to monitor pain and request assistance     Problem: Musculoskeletal - Adult  Goal: Return mobility to safest level of function  Outcome: Progressing  Flowsheets (Taken 3/4/2023 2030)  Return Mobility to Safest Level of Function: Assess patient stability and activity tolerance for standing, transferring and ambulating with or without assistive devices     Problem: Musculoskeletal - Adult  Goal: Maintain proper alignment of affected body part  Outcome: Progressing  Flowsheets (Taken 3/4/2023 2030)  Maintain proper alignment of affected body part: Support and protect limb and body alignment per provider's orders     Problem: Musculoskeletal - Adult  Goal: Return ADL status to a safe level of function  Outcome: Progressing  Flowsheets (Taken 3/4/2023 2030)  Return ADL Status to a Safe Level of Function: Administer medication as ordered

## 2023-03-05 NOTE — PROGRESS NOTES
Physical Therapy  PT hold  Shelly Estrada  Received orders for PT eval and treat. Pt with right quadriceps tendon rupture. Awaiting ortho consult and WB status prior to initiating PT.     Electronically signed by Ronel Mckinney on 3/5/2023 at 8:18 AM

## 2023-03-05 NOTE — PROGRESS NOTES
Patient alert and oriented X4. Patient states 4 out of 10 right leg pain when moving in bed. Patient states she can move it more than yesterday. Patient states she is able to lift her leg today when she could not yesterday. Patient tolerated nightly medications well. Patient verbalizes understanding of education. Family at bedside. Patient vital signs recorded. Fall precautions in place. Bed in lowest position, side rails X2. Bed locks on. Bed alarm on. Non slip socks on. Needed items within reach. Call light within reach. Electronically signed by Lore Martel RN on 3/4/2023 at 9:16 PM

## 2023-03-05 NOTE — CONSULTS
[facility] Orthopedic Surgery  Consult Note    Patient: Manjeet Lazaro  Admit Date: 3/3/2023  Requesting Physician: Iona Parikh MD  Room: N3O-8554/2248-15    Chief complaint: Right knee pain    HPI: Manjeet Lazaro is a 74 y.o. female who presented to [facility] ED Friday evening following a fall. She slipped on wet grass and fell. She reports that her right knee bent backwards. She denies a prior history of injury or surgery to the right lower extremity. She has not had right knee issues before. She otherwise walks without assistive device. She lives independently. She now reports pain and swelling in the right knee. She denies diabetes, tobacco use. She is unable to ambulate at this time due to right knee pain. She denies right hip pain. Relevant notes, labs and other tests reviewed. Medical History:  Past Medical History:   Diagnosis Date    Hyperlipidemia     Hypertension      History reviewed. No pertinent surgical history. Social History:    reports that she has never smoked. She has never used smokeless tobacco.    Family History:  History reviewed. No pertinent family history. Medications:  ALL MEDICATIONS HAVE BEEN REVIEWED:  Scheduled:   metoprolol succinate  25 mg Oral Daily    docusate sodium  100 mg Oral BID    lidocaine  1 patch TransDERmal Daily    [Held by provider] lisinopril  20 mg Oral Daily    atorvastatin  10 mg Oral Daily    pantoprazole  40 mg Oral QAM AC    sodium chloride flush  5-40 mL IntraVENous 2 times per day     Continuous:   sodium chloride       PRN:acetaminophen, morphine, methocarbamol, sodium chloride flush, sodium chloride, ondansetron **OR** ondansetron, polyethylene glycol, oxyCODONE    Allergies: No Known Allergies    Review of Systems:  Constitutional: Negative for fever, chills, fatigue. Skin:  Negative for pruritis, rash  Eyes: Negative for photophobia and visual disturbance.    ENT:  Negative for rhinorrhea, epistaxis, sore throat  Respiratory:  Negative for cough and shortness of breath. Cardiovascular: Negative for chest pain. Gastrointestinal: Negative for nausea, vomiting, diarrhea. Genitourinary: Negative for dysuria and difficulty urinating. Neurological: Negative for confusion, dysarthria, tremors, seizures. Psychiatric:  Negative for depression or anxiety  Musculoskeletal:  Positive for right knee pain and swelling    Objective:  Vitals:    03/05/23 1603   BP: 119/80   Pulse: 98   Resp: 16   Temp: 98.7 °F (37.1 °C)   SpO2: 92%      Physical Examination:  GENERAL: No apparent distress, well-nourished  SKIN:  Warm and dry  HEAD: Normocephalic, atraumatic  RESPIRATORY: Resp easy and unlabored  NEURO: Awake and alert. No speech defect  PSYCHIATRIC: Appropriate affect; not agitated  MUSCULOSKELETAL:   RLE: Large knee joint effusion. Unable to perform active straight leg raise. Palpable defect superior to the patella. Tender about the knee. Motor function and sensation intact distally. Labs reviewed:  Recent Labs     03/03/23 2235 03/05/23  0602   WBC 14.6* 8.7   HGB 13.8 12.6   HCT 42.0 37.8    249     Recent Labs     03/03/23 2233 03/05/23  0602    139   K 4.0 4.0   CL 99 102   CO2 28 25   BUN 15 14   CREATININE 0.7 0.9   GLUCOSE 116* 109*   CALCIUM 9.8 9.3   MG  --  2.10   PHOS  --  3.1     Recent Labs     03/05/23  0602   INR 1.02   PROTIME 13.3       Imaging:  CT FEMUR RIGHT WO CONTRAST   Preliminary Result   1. Suspected distal quadriceps tendon rupture at its insertion with   approximately 2.7 cm of retraction. 2. Overlying subcutaneous hematoma. 3. Sclerotic lesion in the right inferior pubic ramus. In the absence of a   primary malignancy history, this most likely reflects a bone island. Comparison with previous exams if available is recommended to ensure   stability. XR FEMUR RIGHT (MIN 2 VIEWS)   Final Result   No fracture. Mild soft tissue thickening of the quadriceps. IMPRESSION:  Right distal quadriceps tendon rupture    RECOMMENDATIONS:  We discussed the diagnosis and treatment options. I recommended operative repair of her distal quadriceps tendon. She may weight-bear as tolerated with a knee brace locked in full extension preoperatively. As she is currently admitted for this issue, plan for OR Monday versus Tuesday based on availability. We went over the risks and complications of surgery including: bleeding, infection, decreased ROM, continued pain, instability, fracture, dislocation, neurovascular injury, post op cognitive disorder, DVT, pulmonary embolism and need for further surgical procedures.     To Spencer MD  3/5/2023

## 2023-03-05 NOTE — PROGRESS NOTES
Hospital Medicine Progress Note     Date:  3/5/2023    PCP: No primary care provider on file. (Tel: None)    Date of Admission: 3/3/2023    Chief complaint:   Chief Complaint   Patient presents with    Fall     Pt fell around 1830 this evening       Brief admission history: 68-year-old female with history of essential hypertension, hyperlipidemia, obesity with BMI of 33 kg/m², who presents to the emergency room with complaints of fall following a sleep, sustained right leg injury that was complicated by pain and difficulty with ambulation. In the emergency room, she had x-ray-right femur, that was unremarkable for fracture; revealed mild soft tissue thickening of the quadriceps. She reportedly denies hitting her head. Assessment/plan:  Right quadriceps tendon rupture. Secondary to mechanical fall. CT-right femur revealed right quadriceps tendon rupture with associated hematoma. Continue multimodal pain regimen. Orthopedic surgery evaluation pending. Fall precautions. No anticoagulation for now due to associated hematoma. Right lower extremity hematoma. Secondary to #1. Hold/avoid antiplatelets and anticoagulants for now. Fall at home, initial encounter. This appears to be mechanical in nature, as patient slipped. Patient denies hitting her head. Will obtain CT-head without contrast to rule out any intracranial pathology. Fall precautions. Therapy evaluations as noted above. Leukocytosis, present on admission. Likely reactive. This has since resolved. Other comorbidities: history of essential hypertension, hyperlipidemia, obesity with BMI of 33 kg/m². Diet: ADULT DIET; Regular  Diet NPO Exceptions are: Sips of Water with Meds    Code status: Full Code   ----------  Subjective  Right thigh pain. No new issues.     Objective  Physical exam:  Vitals: /80   Pulse (!) 101   Temp 98.4 °F (36.9 °C) (Oral)   Resp 18   Ht 5' 1\" (1.549 m)   Wt 175 lb 14.8 oz (79.8 kg)   SpO2 96%   BMI 33.24 kg/m²   Gen/overall appearance: Not in acute distress. Alert. Oriented x3. Head: Normocephalic, atraumatic  Eyes: EOMI, good acuity  ENT: Oral mucosa moist  Neck: No JVD, thyromegaly  CVS: Nml S1S2, no MRG, RRR  Pulm: Clear bilaterally. No crackles/wheezes  Gastrointestinal: Soft, NT/ND, +BS  Musculoskeletal: Massive swelling right knee. Unable to flex or extend right knee beyond about a 15 degree angle. Neuro: No focal deficit. Moves extremity spontaneously. Psychiatry: Appropriate affect. Not agitated. Skin: Warm, dry with normal turgor. No rash  Capillary refill: Brisk,< 3 seconds   Peripheral Pulses: +2 palpable, equal bilaterally      24HR INTAKE/OUTPUT:    Intake/Output Summary (Last 24 hours) at 3/5/2023 0848  Last data filed at 3/4/2023 2123  Gross per 24 hour   Intake 300 ml   Output --   Net 300 ml       I/O last 3 completed shifts: In: 540 [P.O.:540]  Out: -   No intake/output data recorded.     Meds:    metoprolol succinate  25 mg Oral Daily    lidocaine  1 patch TransDERmal Daily    [Held by provider] lisinopril  20 mg Oral Daily    atorvastatin  10 mg Oral Daily    pantoprazole  40 mg Oral QAM AC    sodium chloride flush  5-40 mL IntraVENous 2 times per day     Infusions:    sodium chloride       PRN Meds: acetaminophen, morphine, methocarbamol, sodium chloride flush, sodium chloride, ondansetron **OR** ondansetron, polyethylene glycol, oxyCODONE    Labs/imaging:  CBC:   Recent Labs     03/03/23 2235 03/05/23  0602   WBC 14.6* 8.7   HGB 13.8 12.6    249       BMP:    Recent Labs     03/03/23 2233 03/05/23  0602    139   K 4.0 4.0   CL 99 102   CO2 28 25   BUN 15 14   CREATININE 0.7 0.9   GLUCOSE 116* 109*       Hepatic:   Recent Labs     03/03/23 2233 03/05/23  0602   AST 25 23   ALT 12 11   BILITOT 0.4 0.7   ALKPHOS 72 59         Trisha Narayan MD  -------------------------------  Rounding hospitalist

## 2023-03-05 NOTE — PLAN OF CARE
Problem: Discharge Planning  Goal: Discharge to home or other facility with appropriate resources  3/5/2023 0850 by Josh Tsang RN  Outcome: Progressing  3/5/2023 0230 by Pete Reilly RN  Outcome: Progressing  Flowsheets (Taken 3/4/2023 2030)  Discharge to home or other facility with appropriate resources: Identify barriers to discharge with patient and caregiver     Problem: Safety - Adult  Goal: Free from fall injury  3/5/2023 0850 by Josh Tsang RN  Outcome: Progressing  3/5/2023 0230 by Pete Reilly RN  Outcome: Progressing  Flowsheets (Taken 3/5/2023 0228)  Free From Fall Injury: Instruct family/caregiver on patient safety     Problem: Pain  Goal: Verbalizes/displays adequate comfort level or baseline comfort level  3/5/2023 0850 by Josh Tsang RN  Outcome: Progressing  3/5/2023 0230 by Pete Reilly RN  Outcome: Progressing  Flowsheets (Taken 3/4/2023 2104)  Verbalizes/displays adequate comfort level or baseline comfort level: Encourage patient to monitor pain and request assistance     Problem: Musculoskeletal - Adult  Goal: Return mobility to safest level of function  3/5/2023 0850 by Josh Tsang RN  Outcome: Progressing  3/5/2023 0230 by Pete Reilly RN  Outcome: Progressing  Flowsheets (Taken 3/4/2023 2030)  Return Mobility to Safest Level of Function: Assess patient stability and activity tolerance for standing, transferring and ambulating with or without assistive devices  Goal: Maintain proper alignment of affected body part  3/5/2023 0850 by Josh Tsang RN  Outcome: Progressing  3/5/2023 0230 by Pete Reilly RN  Outcome: Progressing  Flowsheets (Taken 3/4/2023 2030)  Maintain proper alignment of affected body part: Support and protect limb and body alignment per provider's orders  Goal: Return ADL status to a safe level of function  3/5/2023 0850 by Josh Tsang RN  Outcome: Progressing  3/5/2023 0230 by Pete Reilly RN  Outcome: Progressing  Flowsheets (Taken 3/4/2023 2030)  Return ADL Status to a Safe Level of Function: Administer medication as ordered

## 2023-03-05 NOTE — PROGRESS NOTES
Pt had not voided this shift, bladder scanned for greater than 999. Paged Dr. Nathanael Saenz, order to place mayfield. Mayfield placed, pt tolerated well, 1000 ml of clear yellow urine returned immediately.

## 2023-03-06 ENCOUNTER — ANESTHESIA EVENT (OUTPATIENT)
Dept: OPERATING ROOM | Age: 73
DRG: 502 | End: 2023-03-06
Payer: MEDICARE

## 2023-03-06 ENCOUNTER — ANESTHESIA (OUTPATIENT)
Dept: OPERATING ROOM | Age: 73
DRG: 502 | End: 2023-03-06
Payer: MEDICARE

## 2023-03-06 LAB
A/G RATIO: 1.3 (ref 1.1–2.2)
ALBUMIN SERPL-MCNC: 3.9 G/DL (ref 3.4–5)
ALP BLD-CCNC: 57 U/L (ref 40–129)
ALT SERPL-CCNC: 11 U/L (ref 10–40)
ANION GAP SERPL CALCULATED.3IONS-SCNC: 10 MMOL/L (ref 3–16)
AST SERPL-CCNC: 20 U/L (ref 15–37)
BASOPHILS ABSOLUTE: 0.1 K/UL (ref 0–0.2)
BASOPHILS RELATIVE PERCENT: 0.7 %
BILIRUB SERPL-MCNC: 0.7 MG/DL (ref 0–1)
BUN BLDV-MCNC: 12 MG/DL (ref 7–20)
CALCIUM SERPL-MCNC: 9.2 MG/DL (ref 8.3–10.6)
CHLORIDE BLD-SCNC: 101 MMOL/L (ref 99–110)
CO2: 28 MMOL/L (ref 21–32)
CREAT SERPL-MCNC: 0.8 MG/DL (ref 0.6–1.2)
EOSINOPHILS ABSOLUTE: 0.1 K/UL (ref 0–0.6)
EOSINOPHILS RELATIVE PERCENT: 1.1 %
GFR SERPL CREATININE-BSD FRML MDRD: >60 ML/MIN/{1.73_M2}
GLUCOSE BLD-MCNC: 100 MG/DL (ref 70–99)
HCT VFR BLD CALC: 36.3 % (ref 36–48)
HEMOGLOBIN: 12.3 G/DL (ref 12–16)
LYMPHOCYTES ABSOLUTE: 2.6 K/UL (ref 1–5.1)
LYMPHOCYTES RELATIVE PERCENT: 25.8 %
MAGNESIUM: 2.2 MG/DL (ref 1.8–2.4)
MCH RBC QN AUTO: 30.4 PG (ref 26–34)
MCHC RBC AUTO-ENTMCNC: 33.8 G/DL (ref 31–36)
MCV RBC AUTO: 89.9 FL (ref 80–100)
MONOCYTES ABSOLUTE: 0.6 K/UL (ref 0–1.3)
MONOCYTES RELATIVE PERCENT: 5.8 %
NEUTROPHILS ABSOLUTE: 6.6 K/UL (ref 1.7–7.7)
NEUTROPHILS RELATIVE PERCENT: 66.6 %
PDW BLD-RTO: 14.7 % (ref 12.4–15.4)
PHOSPHORUS: 2.6 MG/DL (ref 2.5–4.9)
PLATELET # BLD: 254 K/UL (ref 135–450)
PMV BLD AUTO: 7.7 FL (ref 5–10.5)
POTASSIUM SERPL-SCNC: 3.9 MMOL/L (ref 3.5–5.1)
RBC # BLD: 4.04 M/UL (ref 4–5.2)
SODIUM BLD-SCNC: 139 MMOL/L (ref 136–145)
TOTAL PROTEIN: 6.9 G/DL (ref 6.4–8.2)
WBC # BLD: 10 K/UL (ref 4–11)

## 2023-03-06 PROCEDURE — 6370000000 HC RX 637 (ALT 250 FOR IP): Performed by: INTERNAL MEDICINE

## 2023-03-06 PROCEDURE — 80053 COMPREHEN METABOLIC PANEL: CPT

## 2023-03-06 PROCEDURE — 94760 N-INVAS EAR/PLS OXIMETRY 1: CPT

## 2023-03-06 PROCEDURE — 1200000000 HC SEMI PRIVATE

## 2023-03-06 PROCEDURE — 3700000000 HC ANESTHESIA ATTENDED CARE: Performed by: ORTHOPAEDIC SURGERY

## 2023-03-06 PROCEDURE — 0LQL0ZZ REPAIR RIGHT UPPER LEG TENDON, OPEN APPROACH: ICD-10-PCS | Performed by: ORTHOPAEDIC SURGERY

## 2023-03-06 PROCEDURE — 2500000003 HC RX 250 WO HCPCS: Performed by: ORTHOPAEDIC SURGERY

## 2023-03-06 PROCEDURE — 2500000003 HC RX 250 WO HCPCS: Performed by: NURSE ANESTHETIST, CERTIFIED REGISTERED

## 2023-03-06 PROCEDURE — 6360000002 HC RX W HCPCS: Performed by: ORTHOPAEDIC SURGERY

## 2023-03-06 PROCEDURE — 94150 VITAL CAPACITY TEST: CPT

## 2023-03-06 PROCEDURE — 2580000003 HC RX 258: Performed by: PEDIATRICS

## 2023-03-06 PROCEDURE — 2580000003 HC RX 258: Performed by: NURSE ANESTHETIST, CERTIFIED REGISTERED

## 2023-03-06 PROCEDURE — L1830 KO IMMOB CANVAS LONG PRE OTS: HCPCS | Performed by: ORTHOPAEDIC SURGERY

## 2023-03-06 PROCEDURE — A4217 STERILE WATER/SALINE, 500 ML: HCPCS | Performed by: ORTHOPAEDIC SURGERY

## 2023-03-06 PROCEDURE — 6360000002 HC RX W HCPCS: Performed by: NURSE ANESTHETIST, CERTIFIED REGISTERED

## 2023-03-06 PROCEDURE — 85025 COMPLETE CBC W/AUTO DIFF WBC: CPT

## 2023-03-06 PROCEDURE — 3700000001 HC ADD 15 MINUTES (ANESTHESIA): Performed by: ORTHOPAEDIC SURGERY

## 2023-03-06 PROCEDURE — 7100000000 HC PACU RECOVERY - FIRST 15 MIN: Performed by: ORTHOPAEDIC SURGERY

## 2023-03-06 PROCEDURE — 2720000010 HC SURG SUPPLY STERILE: Performed by: ORTHOPAEDIC SURGERY

## 2023-03-06 PROCEDURE — 83735 ASSAY OF MAGNESIUM: CPT

## 2023-03-06 PROCEDURE — 6370000000 HC RX 637 (ALT 250 FOR IP): Performed by: ORTHOPAEDIC SURGERY

## 2023-03-06 PROCEDURE — 2580000003 HC RX 258: Performed by: ORTHOPAEDIC SURGERY

## 2023-03-06 PROCEDURE — 3600000004 HC SURGERY LEVEL 4 BASE: Performed by: ORTHOPAEDIC SURGERY

## 2023-03-06 PROCEDURE — 27385 REPAIR OF THIGH MUSCLE: CPT | Performed by: ORTHOPAEDIC SURGERY

## 2023-03-06 PROCEDURE — 36415 COLL VENOUS BLD VENIPUNCTURE: CPT

## 2023-03-06 PROCEDURE — 84100 ASSAY OF PHOSPHORUS: CPT

## 2023-03-06 PROCEDURE — 3600000014 HC SURGERY LEVEL 4 ADDTL 15MIN: Performed by: ORTHOPAEDIC SURGERY

## 2023-03-06 PROCEDURE — 27385 REPAIR OF THIGH MUSCLE: CPT | Performed by: PHYSICIAN ASSISTANT

## 2023-03-06 PROCEDURE — 7100000001 HC PACU RECOVERY - ADDTL 15 MIN: Performed by: ORTHOPAEDIC SURGERY

## 2023-03-06 PROCEDURE — 2709999900 HC NON-CHARGEABLE SUPPLY: Performed by: ORTHOPAEDIC SURGERY

## 2023-03-06 RX ORDER — ONDANSETRON 2 MG/ML
4 INJECTION INTRAMUSCULAR; INTRAVENOUS
Status: DISCONTINUED | OUTPATIENT
Start: 2023-03-06 | End: 2023-03-06 | Stop reason: HOSPADM

## 2023-03-06 RX ORDER — LORAZEPAM 2 MG/ML
0.5 INJECTION INTRAMUSCULAR
Status: DISCONTINUED | OUTPATIENT
Start: 2023-03-06 | End: 2023-03-06 | Stop reason: HOSPADM

## 2023-03-06 RX ORDER — LABETALOL HYDROCHLORIDE 5 MG/ML
INJECTION, SOLUTION INTRAVENOUS PRN
Status: DISCONTINUED | OUTPATIENT
Start: 2023-03-06 | End: 2023-03-06 | Stop reason: SDUPTHER

## 2023-03-06 RX ORDER — SODIUM CHLORIDE 9 MG/ML
INJECTION, SOLUTION INTRAVENOUS CONTINUOUS PRN
Status: DISCONTINUED | OUTPATIENT
Start: 2023-03-06 | End: 2023-03-06 | Stop reason: SDUPTHER

## 2023-03-06 RX ORDER — GLYCOPYRROLATE 0.2 MG/ML
INJECTION INTRAMUSCULAR; INTRAVENOUS PRN
Status: DISCONTINUED | OUTPATIENT
Start: 2023-03-06 | End: 2023-03-06 | Stop reason: SDUPTHER

## 2023-03-06 RX ORDER — DEXAMETHASONE SODIUM PHOSPHATE 4 MG/ML
INJECTION, SOLUTION INTRA-ARTICULAR; INTRALESIONAL; INTRAMUSCULAR; INTRAVENOUS; SOFT TISSUE PRN
Status: DISCONTINUED | OUTPATIENT
Start: 2023-03-06 | End: 2023-03-06 | Stop reason: SDUPTHER

## 2023-03-06 RX ORDER — BUPIVACAINE HYDROCHLORIDE 5 MG/ML
INJECTION, SOLUTION EPIDURAL; INTRACAUDAL
Status: COMPLETED | OUTPATIENT
Start: 2023-03-06 | End: 2023-03-06

## 2023-03-06 RX ORDER — MIDAZOLAM HYDROCHLORIDE 1 MG/ML
INJECTION INTRAMUSCULAR; INTRAVENOUS PRN
Status: DISCONTINUED | OUTPATIENT
Start: 2023-03-06 | End: 2023-03-06 | Stop reason: SDUPTHER

## 2023-03-06 RX ORDER — MAGNESIUM HYDROXIDE 1200 MG/15ML
LIQUID ORAL CONTINUOUS PRN
Status: DISCONTINUED | OUTPATIENT
Start: 2023-03-06 | End: 2023-03-06 | Stop reason: HOSPADM

## 2023-03-06 RX ORDER — ROCURONIUM BROMIDE 10 MG/ML
INJECTION, SOLUTION INTRAVENOUS PRN
Status: DISCONTINUED | OUTPATIENT
Start: 2023-03-06 | End: 2023-03-06 | Stop reason: SDUPTHER

## 2023-03-06 RX ORDER — SODIUM CHLORIDE 9 MG/ML
INJECTION, SOLUTION INTRAVENOUS PRN
Status: DISCONTINUED | OUTPATIENT
Start: 2023-03-06 | End: 2023-03-06 | Stop reason: HOSPADM

## 2023-03-06 RX ORDER — ONDANSETRON 2 MG/ML
INJECTION INTRAMUSCULAR; INTRAVENOUS PRN
Status: DISCONTINUED | OUTPATIENT
Start: 2023-03-06 | End: 2023-03-06 | Stop reason: SDUPTHER

## 2023-03-06 RX ORDER — HALOPERIDOL 5 MG/ML
1 INJECTION INTRAMUSCULAR
Status: DISCONTINUED | OUTPATIENT
Start: 2023-03-06 | End: 2023-03-06 | Stop reason: HOSPADM

## 2023-03-06 RX ORDER — SODIUM CHLORIDE 0.9 % (FLUSH) 0.9 %
5-40 SYRINGE (ML) INJECTION EVERY 12 HOURS SCHEDULED
Status: DISCONTINUED | OUTPATIENT
Start: 2023-03-06 | End: 2023-03-06 | Stop reason: HOSPADM

## 2023-03-06 RX ORDER — SUCCINYLCHOLINE/SOD CL,ISO/PF 200MG/10ML
SYRINGE (ML) INTRAVENOUS PRN
Status: DISCONTINUED | OUTPATIENT
Start: 2023-03-06 | End: 2023-03-06 | Stop reason: SDUPTHER

## 2023-03-06 RX ORDER — FENTANYL CITRATE 50 UG/ML
50 INJECTION, SOLUTION INTRAMUSCULAR; INTRAVENOUS EVERY 5 MIN PRN
Status: DISCONTINUED | OUTPATIENT
Start: 2023-03-06 | End: 2023-03-06 | Stop reason: HOSPADM

## 2023-03-06 RX ORDER — PROPOFOL 10 MG/ML
INJECTION, EMULSION INTRAVENOUS PRN
Status: DISCONTINUED | OUTPATIENT
Start: 2023-03-06 | End: 2023-03-06 | Stop reason: SDUPTHER

## 2023-03-06 RX ORDER — SODIUM CHLORIDE 0.9 % (FLUSH) 0.9 %
5-40 SYRINGE (ML) INJECTION PRN
Status: DISCONTINUED | OUTPATIENT
Start: 2023-03-06 | End: 2023-03-06 | Stop reason: HOSPADM

## 2023-03-06 RX ORDER — DIPHENHYDRAMINE HYDROCHLORIDE 50 MG/ML
12.5 INJECTION INTRAMUSCULAR; INTRAVENOUS
Status: DISCONTINUED | OUTPATIENT
Start: 2023-03-06 | End: 2023-03-06 | Stop reason: HOSPADM

## 2023-03-06 RX ORDER — ENOXAPARIN SODIUM 100 MG/ML
30 INJECTION SUBCUTANEOUS 2 TIMES DAILY
Status: DISCONTINUED | OUTPATIENT
Start: 2023-03-07 | End: 2023-03-08 | Stop reason: HOSPADM

## 2023-03-06 RX ORDER — FENTANYL CITRATE 50 UG/ML
INJECTION, SOLUTION INTRAMUSCULAR; INTRAVENOUS PRN
Status: DISCONTINUED | OUTPATIENT
Start: 2023-03-06 | End: 2023-03-06 | Stop reason: SDUPTHER

## 2023-03-06 RX ORDER — LIDOCAINE HYDROCHLORIDE 20 MG/ML
INJECTION, SOLUTION EPIDURAL; INFILTRATION; INTRACAUDAL; PERINEURAL PRN
Status: DISCONTINUED | OUTPATIENT
Start: 2023-03-06 | End: 2023-03-06 | Stop reason: SDUPTHER

## 2023-03-06 RX ADMIN — FENTANYL CITRATE 100 MCG: 50 INJECTION INTRAMUSCULAR; INTRAVENOUS at 15:35

## 2023-03-06 RX ADMIN — PROPOFOL 100 MG: 10 INJECTION, EMULSION INTRAVENOUS at 15:38

## 2023-03-06 RX ADMIN — LABETALOL HYDROCHLORIDE 5 MG: 5 INJECTION, SOLUTION INTRAVENOUS at 16:08

## 2023-03-06 RX ADMIN — ROCURONIUM BROMIDE 40 MG: 50 INJECTION INTRAVENOUS at 15:41

## 2023-03-06 RX ADMIN — METOPROLOL SUCCINATE 25 MG: 25 TABLET, EXTENDED RELEASE ORAL at 09:50

## 2023-03-06 RX ADMIN — FENTANYL CITRATE 50 MCG: 50 INJECTION INTRAMUSCULAR; INTRAVENOUS at 16:43

## 2023-03-06 RX ADMIN — ROCURONIUM BROMIDE 10 MG: 50 INJECTION INTRAVENOUS at 15:38

## 2023-03-06 RX ADMIN — GLYCOPYRROLATE 0.2 MG: 0.2 INJECTION INTRAMUSCULAR; INTRAVENOUS at 15:43

## 2023-03-06 RX ADMIN — SODIUM CHLORIDE: 9 INJECTION, SOLUTION INTRAVENOUS at 15:33

## 2023-03-06 RX ADMIN — LABETALOL HYDROCHLORIDE 5 MG: 5 INJECTION, SOLUTION INTRAVENOUS at 16:01

## 2023-03-06 RX ADMIN — ATORVASTATIN CALCIUM 10 MG: 10 TABLET, FILM COATED ORAL at 09:50

## 2023-03-06 RX ADMIN — DOCUSATE SODIUM 100 MG: 100 CAPSULE, LIQUID FILLED ORAL at 21:01

## 2023-03-06 RX ADMIN — ONDANSETRON 4 MG: 2 INJECTION INTRAMUSCULAR; INTRAVENOUS at 15:43

## 2023-03-06 RX ADMIN — FENTANYL CITRATE 25 MCG: 50 INJECTION INTRAMUSCULAR; INTRAVENOUS at 16:29

## 2023-03-06 RX ADMIN — SODIUM CHLORIDE: 9 INJECTION, SOLUTION INTRAVENOUS at 16:26

## 2023-03-06 RX ADMIN — DEXAMETHASONE SODIUM PHOSPHATE 8 MG: 4 INJECTION, SOLUTION INTRAMUSCULAR; INTRAVENOUS at 15:43

## 2023-03-06 RX ADMIN — Medication 100 MG: at 15:38

## 2023-03-06 RX ADMIN — ROCURONIUM BROMIDE 10 MG: 50 INJECTION INTRAVENOUS at 16:17

## 2023-03-06 RX ADMIN — CEFAZOLIN 2000 MG: 2 INJECTION, POWDER, FOR SOLUTION INTRAMUSCULAR; INTRAVENOUS at 15:33

## 2023-03-06 RX ADMIN — MIDAZOLAM 2 MG: 1 INJECTION INTRAMUSCULAR; INTRAVENOUS at 15:33

## 2023-03-06 RX ADMIN — FENTANYL CITRATE 25 MCG: 50 INJECTION INTRAMUSCULAR; INTRAVENOUS at 16:34

## 2023-03-06 RX ADMIN — SODIUM CHLORIDE, PRESERVATIVE FREE 10 ML: 5 INJECTION INTRAVENOUS at 21:01

## 2023-03-06 RX ADMIN — LIDOCAINE HYDROCHLORIDE 60 MG: 20 INJECTION, SOLUTION EPIDURAL; INFILTRATION; INTRACAUDAL; PERINEURAL at 15:38

## 2023-03-06 RX ADMIN — SUGAMMADEX 200 MG: 100 INJECTION, SOLUTION INTRAVENOUS at 16:34

## 2023-03-06 RX ADMIN — SODIUM CHLORIDE, PRESERVATIVE FREE 10 ML: 5 INJECTION INTRAVENOUS at 09:55

## 2023-03-06 ASSESSMENT — LIFESTYLE VARIABLES: SMOKING_STATUS: 0

## 2023-03-06 ASSESSMENT — PAIN SCALES - GENERAL
PAINLEVEL_OUTOF10: 0

## 2023-03-06 ASSESSMENT — PAIN - FUNCTIONAL ASSESSMENT: PAIN_FUNCTIONAL_ASSESSMENT: NONE - DENIES PAIN

## 2023-03-06 ASSESSMENT — ENCOUNTER SYMPTOMS: SHORTNESS OF BREATH: 0

## 2023-03-06 NOTE — PROGRESS NOTES
Hospital Medicine Progress Note     Date:  3/6/2023    PCP: No primary care provider on file. (Tel: None)    Date of Admission: 3/3/2023    Chief complaint:   Chief Complaint   Patient presents with    Fall     Pt fell around 1830 this evening       Brief admission history: 68-year-old female with history of essential hypertension, hyperlipidemia, obesity with BMI of 33 kg/m², who presents to the emergency room with complaints of fall following a sleep, sustained right leg injury that was complicated by pain and difficulty with ambulation. In the emergency room, she had x-ray-right femur, that was unremarkable for fracture; revealed mild soft tissue thickening of the quadriceps. She reportedly denies hitting her head. Assessment/plan:  Right quadriceps tendon rupture. Secondary to mechanical fall. CT-right femur revealed right quadriceps tendon rupture with associated hematoma. Continue multimodal pain regimen. Orthopedics planning surgery later today. Fall precautions. Patient has low RCRI score, deemed low risk for cardiac complications from noncardiac surgery. Okay to proceed with needed surgery. No anticoagulation for now due to associated hematoma. Restart anticoagulation when okay from orthopedic surgery standpoint. Right lower extremity hematoma. Secondary to #1. Hold/avoid antiplatelets and anticoagulants for now. Fall at home, initial encounter. This appears to be mechanical in nature, as patient slipped. Patient denies hitting her head. Will obtain CT-head without contrast to rule out any intracranial pathology. Fall precautions. Therapy evaluations as noted above. Leukocytosis, present on admission. Likely reactive. This has since resolved. Other comorbidities: history of essential hypertension, hyperlipidemia, obesity with BMI of 33 kg/m². Disposition. Patient will likely need SNF placement following hospitalization.     Diet: Diet NPO Exceptions are: Sips of Water with Meds    Code status: Full Code   ----------  Subjective  Still with distal right thigh pain, albeit improved. Awaiting surgery at 3:30 PM today. Objective  Physical exam:  Vitals: /68   Pulse 100   Temp 98.3 °F (36.8 °C) (Oral)   Resp 18   Ht 5' 1\" (1.549 m)   Wt 175 lb 14.8 oz (79.8 kg)   SpO2 93%   BMI 33.24 kg/m²   Gen/overall appearance: Not in acute distress. Alert. Oriented x3. Head: Normocephalic, atraumatic  Eyes: EOMI, good acuity  ENT: Oral mucosa moist  Neck: No JVD, thyromegaly  CVS: Nml S1S2, no MRG, RRR  Pulm: Clear bilaterally. No crackles/wheezes  Gastrointestinal: Soft, NT/ND, +BS  Musculoskeletal: Mild swelling of right knee. Unable to flex or extend right knee beyond about a 15 degree angle. Neuro: No focal deficit. Moves extremity spontaneously. Psychiatry: Appropriate affect. Not agitated. Skin: Warm, dry with normal turgor. No rash  Capillary refill: Brisk,< 3 seconds   Peripheral Pulses: +2 palpable, equal bilaterally      24HR INTAKE/OUTPUT:    Intake/Output Summary (Last 24 hours) at 3/6/2023 1110  Last data filed at 3/6/2023 0859  Gross per 24 hour   Intake --   Output 1650 ml   Net -1650 ml       I/O last 3 completed shifts:   In: 5 [P.O.:420]  Out: 1350 [Urine:1350]  I/O this shift:  In: -   Out: 650 [Urine:650]    Meds:    metoprolol succinate  25 mg Oral Daily    docusate sodium  100 mg Oral BID    lidocaine  1 patch TransDERmal Daily    [Held by provider] lisinopril  20 mg Oral Daily    atorvastatin  10 mg Oral Daily    pantoprazole  40 mg Oral QAM AC    sodium chloride flush  5-40 mL IntraVENous 2 times per day     Infusions:    sodium chloride       PRN Meds: acetaminophen, morphine, methocarbamol, sodium chloride flush, sodium chloride, ondansetron **OR** ondansetron, polyethylene glycol, oxyCODONE    Labs/imaging:  CBC:   Recent Labs     03/03/23  2235 03/05/23  0602 03/06/23  0535   WBC 14.6* 8.7 10.0   HGB 13.8 12.6 12.3    249 254       BMP: Recent Labs     03/03/23 2233 03/05/23 0602 03/06/23  0535    139 139   K 4.0 4.0 3.9   CL 99 102 101   CO2 28 25 28   BUN 15 14 12   CREATININE 0.7 0.9 0.8   GLUCOSE 116* 109* 100*       Hepatic:   Recent Labs     03/03/23 2233 03/05/23 0602 03/06/23  0535   AST 25 23 20   ALT 12 11 11   BILITOT 0.4 0.7 0.7   ALKPHOS 72 59 57         Indy Guerrero MD  -------------------------------  Rounding hospitalist

## 2023-03-06 NOTE — OP NOTE
Patient: Lake Starr  YOB: 1950  MRN: [id number]    DATE OF PROCEDURE: 3/6/2023      PREOPERATIVE DIAGNOSIS: Right distal quadriceps tendon tear     POSTOPERATIVE DIAGNOSIS:  Same    OPERATION PERFORMED: Right distal quadriceps tendon repair    SURGEON:  Jacinto Ba MD     ASSISTANT:  LINDA Rodríguez    EBL: 100 mL     INDICATIONS:  The patient is a 74 y.o. female who sustained a traumatic tear of her distal quadriceps tendon of her right knee from a mechanical fall 3 days ago. We discussed the treatment options and I recommended operative repair of the tendon. The operative procedure, alternatives, and risks were discussed in detail with the patient. The risks include but are not limited to: Infection, vessel injury, nerve injury, DVT, pulmonary embolism, implant loosening, need for revision surgery, loss of motion, continued pain. Informed consent for surgery was signed by the patient. OPERATIVE PROCEDURE:  The patient was seen in the preoperative holding area where the site of surgery was marked and informed consent was confirmed. The patient was brought back to the operating room by OR personnel. General anesthesia was administered. The patient was positioned supine on the operating table. The right lower extremity was then prepped and draped in a standard and sterile fashion. A final and formal timeout was then performed which confirmed the correct patient, correct position, and correct site of surgery. IV antibiotics were administered within 1 hour of the skin incision. An anterior midline incision was made over the knee. Skin and subcutaneous tissue were divided down to the extensor mechanism. Hematoma was evacuated around the quadriceps tendon insertion site. The quadriceps tendon was fully torn off of the patella. There was a retinacular tear laterally as well. The medial retinaculum was largely intact.   There was a small portion of the quadriceps tendon of less than 1 cm left attached to the superior pole of the patella. This was removed and a rongeur was used for debridement of the superior pole of the patella to create a healing surface for the quadriceps tendon repair. An osteophyte was removed in this area as well. Two #5 Ethibond sutures were used in [place] stitch fashion in the quadriceps tendon. A Beath needle was used to make a drill hole through the center of the patella in a superior to inferior direction. I palpated the patellar cartilage to ensure the pin did not penetrate the cartilage. The central 2 stitch ends were passed through the central hole of the patella. The same technique was used for medial and lateral patellar drill holes. The medial and lateral suture ends were passed through the respective holes. The loose ends of the suture were passed under the patella tendon so they lay right at the inferior pole of the patella. With the knee flexed slightly flexed, the suture ends were secured together. The quadriceps tendon came down to sit right on the prepared bony surface of the superior pole of the patella. The lateral retinaculum was repaired with suture. The quadriceps tendon repair was reinforced with #5 Ethibond suture as well as #1 Vicryl. Gentle range of motion was attempted from 0 to 45 degrees. The repair was secure during this range of motion. The wound was irrigated out. Hemostasis was obtained. The wound then was closed in layers. The patient tolerated the procedure well. A dressing was applied, and the patient was brought to the recovery room in good condition. LINDA Cartwright was essential in patient positioning, surgical assistance during the procedure, and in wound closure.     Skyla Hopkins MD  3/6/2023

## 2023-03-06 NOTE — ANESTHESIA PRE PROCEDURE
Department of Anesthesiology  Preprocedure Note       Name:  Tyrone Liriano   Age:  74 y.o.  :  1950                                          MRN:  [id number]         Date:  3/6/2023      Surgeon: Concepcion Santoro):  Julienne Paniagua MD    Procedure: Procedure(s):  RIGHT QUADRICEPS TENDON REPAIR    Medications prior to admission:   Prior to Admission medications    Medication Sig Start Date End Date Taking?  Authorizing Provider   simvastatin (ZOCOR) 20 MG tablet Take 20 mg by mouth nightly   Yes Historical Provider, MD   metoprolol succinate (TOPROL XL) 50 MG extended release tablet Take by mouth daily   Yes Historical Provider, MD   lisinopril (PRINIVIL;ZESTRIL) 20 MG tablet Take 20 mg by mouth daily   Yes Historical Provider, MD       Current medications:    Current Facility-Administered Medications   Medication Dose Route Frequency Provider Last Rate Last Admin    acetaminophen (TYLENOL) tablet 650 mg  650 mg Oral Q6H PRN Ophelia Hernandez MD   650 mg at 23    metoprolol succinate (TOPROL XL) extended release tablet 25 mg  25 mg Oral Daily Ophelia Hernandez MD   25 mg at 23 0950    docusate sodium (COLACE) capsule 100 mg  100 mg Oral BID Ophelia Hernandez MD   100 mg at 23    lidocaine 4 % external patch 1 patch  1 patch TransDERmal Daily Ophelia Hernandez MD   1 patch at 23 0951    morphine (PF) injection 2 mg  2 mg IntraVENous Q4H PRN Ophelia Hernandez MD        [Held by provider] lisinopril (PRINIVIL;ZESTRIL) tablet 20 mg  20 mg Oral Daily Ophelia Hernandez MD        atorvastatin (LIPITOR) tablet 10 mg  10 mg Oral Daily Ophelia Hernandez MD   10 mg at 23 0950    pantoprazole (PROTONIX) tablet 40 mg  40 mg Oral QAM AC Sunny Nichols MD   40 mg at 23 0511    methocarbamol (ROBAXIN) tablet 500 mg  500 mg Oral 4x Daily PRN Ximena Perkins MD        sodium chloride flush 0.9 % injection 5-40 mL  5-40 mL IntraVENous 2 times per day Galindo SANDERS MD Angelito   10 mL at 03/06/23 0955    sodium chloride flush 0.9 % injection 5-40 mL  5-40 mL IntraVENous PRN Galindo Rodriguez MD        0.9 % sodium chloride infusion   IntraVENous PRN Jose David Westfall MD        ondansetron (ZOFRAN-ODT) disintegrating tablet 4 mg  4 mg Oral Q8H PRN Galindo Rodriguez MD        Or    ondansetron (ZOFRAN) injection 4 mg  4 mg IntraVENous Q6H PRN Galindo Rodriguez MD        polyethylene glycol (GLYCOLAX) packet 17 g  17 g Oral Daily PRN Jose David Westfall MD        oxyCODONE (ROXICODONE) immediate release tablet 5 mg  5 mg Oral Q4H PRN Jose David Westfall MD   5 mg at 03/04/23 0021       Allergies:  No Known Allergies    Problem List:    Patient Active Problem List   Diagnosis Code    Leg pain M79.606    Quadriceps tendon rupture, right, initial encounter F31.939K       Past Medical History:        Diagnosis Date    Hyperlipidemia     Hypertension        Past Surgical History:  History reviewed. No pertinent surgical history.     Social History:    Social History     Tobacco Use    Smoking status: Never    Smokeless tobacco: Never   Substance Use Topics    Alcohol use: Not Currently                                Counseling given: Not Answered      Vital Signs (Current):   Vitals:    03/06/23 0454 03/06/23 0805 03/06/23 0845 03/06/23 1444   BP: 113/80  129/68 126/71   Pulse: 83  100 (!) 108   Resp: 18 18 18 18   Temp: 98.5 °F (36.9 °C)  98.3 °F (36.8 °C) (!) 96.3 °F (35.7 °C)   TempSrc: Oral  Oral    SpO2: 96% 95% 93% 96%   Weight: 175 lb 14.8 oz (79.8 kg)      Height: 5' 1\" (1.549 m)                                                 BP Readings from Last 3 Encounters:   03/06/23 126/71       NPO Status: Time of last liquid consumption: 0800 (sips with meds)                        Time of last solid consumption: 1800                        Date of last liquid consumption: 03/06/23                        Date of last solid food consumption: 03/05/23    BMI:   Wt Readings from Last 3 Encounters:   03/06/23 175 lb 14.8 oz (79.8 kg)     Body mass index is 33.24 kg/m².    CBC:   Lab Results   Component Value Date/Time    WBC 10.0 03/06/2023 05:35 AM    RBC 4.04 03/06/2023 05:35 AM    HGB 12.3 03/06/2023 05:35 AM    HCT 36.3 03/06/2023 05:35 AM    MCV 89.9 03/06/2023 05:35 AM    RDW 14.7 03/06/2023 05:35 AM     03/06/2023 05:35 AM       CMP:   Lab Results   Component Value Date/Time     03/06/2023 05:35 AM    K 3.9 03/06/2023 05:35 AM    K 4.0 03/03/2023 10:33 PM     03/06/2023 05:35 AM    CO2 28 03/06/2023 05:35 AM    BUN 12 03/06/2023 05:35 AM    CREATININE 0.8 03/06/2023 05:35 AM    AGRATIO 1.3 03/06/2023 05:35 AM    LABGLOM >60 03/06/2023 05:35 AM    GLUCOSE 100 03/06/2023 05:35 AM    PROT 6.9 03/06/2023 05:35 AM    CALCIUM 9.2 03/06/2023 05:35 AM    BILITOT 0.7 03/06/2023 05:35 AM    ALKPHOS 57 03/06/2023 05:35 AM    AST 20 03/06/2023 05:35 AM    ALT 11 03/06/2023 05:35 AM       POC Tests: No results for input(s): POCGLU, POCNA, POCK, POCCL, POCBUN, POCHEMO, POCHCT in the last 72 hours.    Coags:   Lab Results   Component Value Date/Time    PROTIME 13.3 03/05/2023 06:02 AM    INR 1.02 03/05/2023 06:02 AM       HCG (If Applicable): No results found for: PREGTESTUR, PREGSERUM, HCG, HCGQUANT     ABGs: No results found for: PHART, PO2ART, AWB4TQK, WLI9BSM, BEART, L1IVFLCW     Type & Screen (If Applicable):  No results found for: LABABO, LABRH    Drug/Infectious Status (If Applicable):  No results found for: HIV, HEPCAB    COVID-19 Screening (If Applicable): No results found for: COVID19        Anesthesia Evaluation  Patient summary reviewed no history of anesthetic complications:   Airway: Mallampati: III  TM distance: >3 FB   Neck ROM: full  Mouth opening: > = 3 FB   Dental:    (+) lower dentures      Pulmonary:Negative Pulmonary ROS       (-) shortness of breath and not a current smoker          Patient did not smoke on day of surgery.                  Cardiovascular:    (+) hypertension:, hyperlipidemia    (-) pacemaker, past MI, CABG/stent and  angina       Beta Blocker:  Not on Beta Blocker         Neuro/Psych:   Negative Neuro/Psych ROS     (-) seizures and CVA           GI/Hepatic/Renal: Neg GI/Hepatic/Renal ROS       (-) liver disease and no renal disease       Endo/Other: Negative Endo/Other ROS       (-) diabetes mellitus, hypothyroidism, hyperthyroidism               Abdominal:   (+) obese,           Vascular: negative vascular ROS. Other Findings:           Anesthesia Plan      general     ASA 2       Induction: intravenous. MIPS: Postoperative opioids intended and Prophylactic antiemetics administered. Anesthetic plan and risks discussed with patient. Plan discussed with CRNA. This pre-anesthesia assessment may be used as a history and physical.    DOS STAFF ADDENDUM:    Pt seen and examined, chart reviewed (including anesthesia, drug and allergy history). No interval changes to history and physical examination. Anesthetic plan, risks, benefits, alternatives, and personnel involved discussed with patient. Patient verbalized an understanding and agrees to proceed.       Corey Mayorga MD  March 6, 2023  3:05 PM

## 2023-03-06 NOTE — PLAN OF CARE
Problem: Discharge Planning  Goal: Discharge to home or other facility with appropriate resources  3/6/2023 1157 by Evelyn Salomon RN  Outcome: Progressing  3/6/2023 0413 by Gui Scott RN  Outcome: Progressing  Flowsheets (Taken 3/5/2023 2000)  Discharge to home or other facility with appropriate resources: Identify barriers to discharge with patient and caregiver     Problem: Safety - Adult  Goal: Free from fall injury  3/6/2023 1157 by Evelyn Salomon RN  Outcome: Progressing  3/6/2023 0413 by Gui Scott RN  Outcome: Progressing  Flowsheets (Taken 3/6/2023 0412)  Free From Fall Injury: Instruct family/caregiver on patient safety     Problem: Pain  Goal: Verbalizes/displays adequate comfort level or baseline comfort level  3/6/2023 1157 by Evelyn Salomon RN  Outcome: Progressing  Flowsheets (Taken 3/6/2023 0454 by Gui Scott RN)  Verbalizes/displays adequate comfort level or baseline comfort level: Encourage patient to monitor pain and request assistance  3/6/2023 0413 by Gui Scott RN  Outcome: Progressing     Problem: Musculoskeletal - Adult  Goal: Return mobility to safest level of function  3/6/2023 1157 by Evelyn Salomon RN  Outcome: Progressing  3/6/2023 0413 by Gui Scott RN  Outcome: Progressing  Flowsheets (Taken 3/5/2023 2000)  Return Mobility to Safest Level of Function: Assess patient stability and activity tolerance for standing, transferring and ambulating with or without assistive devices  Goal: Maintain proper alignment of affected body part  3/6/2023 1157 by Evelyn Salomon RN  Outcome: Progressing  3/6/2023 0413 by Gui Scott RN  Outcome: Progressing  Flowsheets (Taken 3/5/2023 2000)  Maintain proper alignment of affected body part: Support and protect limb and body alignment per provider's orders  Goal: Return ADL status to a safe level of function  3/6/2023 1157 by Evelyn Salomon RN  Outcome: Progressing  3/6/2023 0413 by Gui Scott RN  Outcome: Progressing  Flowsheets (Taken 3/5/2023 2000)  Return ADL Status to a Safe Level of Function: Administer medication as ordered

## 2023-03-06 NOTE — PROGRESS NOTES
[facility] Orthopedic Surgery   Progress Note      S/P :  SUBJECTIVE  in bed. Alert and oriented. States has not eaten this AM \"because I am having surgery\". Pain is   described in right distal thigh and with the intensity of moderate. Pain is described as tender. OBJECTIVE              Physical                      VITALS:  /68   Pulse 100   Temp 98.3 °F (36.8 °C) (Oral)   Resp 18   Ht 5' 1\" (1.549 m)   Wt 175 lb 14.8 oz (79.8 kg)   SpO2 93%   BMI 33.24 kg/m²                     MUSCULOSKELETAL:  right foot NVI. Wiggles toes to command. Able to plantarflex and dorsiflex ankle Pedal pulses are palpable. Weak right hip flexion due to thigh muscle weakness. Notable flattening of right distal quadricep compared to left side.                     NEUROLOGIC:                                  Sensory:  Touch:  Right Lower Extremity:  normal                      Data       CBC:   Lab Results   Component Value Date/Time    WBC 10.0 03/06/2023 05:35 AM    RBC 4.04 03/06/2023 05:35 AM    HGB 12.3 03/06/2023 05:35 AM    HCT 36.3 03/06/2023 05:35 AM    MCV 89.9 03/06/2023 05:35 AM    MCH 30.4 03/06/2023 05:35 AM    MCHC 33.8 03/06/2023 05:35 AM    RDW 14.7 03/06/2023 05:35 AM     03/06/2023 05:35 AM    MPV 7.7 03/06/2023 05:35 AM        WBC:    Lab Results   Component Value Date/Time    WBC 10.0 03/06/2023 05:35 AM        Hemoglobin/Hematocrit:    Lab Results   Component Value Date/Time    HGB 12.3 03/06/2023 05:35 AM    HCT 36.3 03/06/2023 05:35 AM        PT/INR:    Lab Results   Component Value Date/Time    PROTIME 13.3 03/05/2023 06:02 AM    INR 1.02 03/05/2023 06:02 AM              Current Inpatient Medications             Current Facility-Administered Medications: acetaminophen (TYLENOL) tablet 650 mg, 650 mg, Oral, Q6H PRN  metoprolol succinate (TOPROL XL) extended release tablet 25 mg, 25 mg, Oral, Daily  docusate sodium (COLACE) capsule 100 mg, 100 mg, Oral, BID  lidocaine 4 % external patch 1 patch, 1 patch, TransDERmal, Daily  morphine (PF) injection 2 mg, 2 mg, IntraVENous, Q4H PRN  [Held by provider] lisinopril (PRINIVIL;ZESTRIL) tablet 20 mg, 20 mg, Oral, Daily  atorvastatin (LIPITOR) tablet 10 mg, 10 mg, Oral, Daily  pantoprazole (PROTONIX) tablet 40 mg, 40 mg, Oral, QAM AC  methocarbamol (ROBAXIN) tablet 500 mg, 500 mg, Oral, 4x Daily PRN  sodium chloride flush 0.9 % injection 5-40 mL, 5-40 mL, IntraVENous, 2 times per day  sodium chloride flush 0.9 % injection 5-40 mL, 5-40 mL, IntraVENous, PRN  0.9 % sodium chloride infusion, , IntraVENous, PRN  ondansetron (ZOFRAN-ODT) disintegrating tablet 4 mg, 4 mg, Oral, Q8H PRN **OR** ondansetron (ZOFRAN) injection 4 mg, 4 mg, IntraVENous, Q6H PRN  polyethylene glycol (GLYCOLAX) packet 17 g, 17 g, Oral, Daily PRN  oxyCODONE (ROXICODONE) immediate release tablet 5 mg, 5 mg, Oral, Q4H PRN    ASSESSMENT AND PLAN    Right leg pain  Right leg weakness  Right distal quadricep rupture  Plan right distal quad repair today per Dr Cecil Duarte  NPO for surgery  Will plan PT OT postop with SS for DC planning.        ADRIANNE Ferreira - CNP  3/6/2023  10:01 AM

## 2023-03-06 NOTE — ANESTHESIA POSTPROCEDURE EVALUATION
Department of Anesthesiology  Postprocedure Note    Patient: Hayley Sims  MRN: [id number]  YOB: 1950  Date of evaluation: 3/6/2023      Procedure Summary     Date: 03/06/23 Room / Location: [address] [place] [address]    Anesthesia Start: 8915 Anesthesia Stop: 7106    Procedure: RIGHT QUADRICEPS TENDON REPAIR (Right: Knee) Diagnosis:       Injury of quadriceps tendon      (RIGHT QUADRICEPS TENDON TEAR)    Surgeons: Christopher Jennings MD Responsible Provider: Roxane Streeter MD    Anesthesia Type: General ASA Status: 2          Anesthesia Type: General    Keya Phase I: Keya Score: 9    Keya Phase II:        Anesthesia Post Evaluation    Patient location during evaluation: bedside  Patient participation: complete - patient participated  Level of consciousness: awake and alert  Pain score: 0  Nausea & Vomiting: no nausea  Complications: no  Cardiovascular status: hemodynamically stable  Respiratory status: acceptable  Hydration status: stable

## 2023-03-06 NOTE — PROGRESS NOTES
Patient alert and oriented X4. Patient states 1 out of 10 right leg pain. Followed PRN pain management protocol. See MAR. Patient tolerated nightly medications well. Patient verbalizes understanding of education. Patient vital signs recorded. Fall precautions in place. Bed in lowest position, side rails X2. Bed locks on. Bed alarm on. Non slip socks on. Needed items within reach. Call light within reach.  Electronically signed by Anastasia Altman RN on 3/5/2023 at 8:23 PM

## 2023-03-06 NOTE — PROGRESS NOTES
Occupational Therapy  Attempt Note    Nagi Martinez  3/6/2023    OT orders received and chart reviewed. Pt admitted with Right distal quadriceps tendon rupture. Ortho consulted and recommend operative repair. Per ortho She may weight-bear as tolerated with a knee brace locked in full extension preoperatively. Plan for OR Monday versus Tuesday based on availability. OT attempted to see for eval, however, pt does not have knee brace at this time. Will await brace to be delivered, or see the pt post-op. Will need new orders with updated WB status post-op.      Mi Tuttle, OTR/L [id number]

## 2023-03-06 NOTE — PLAN OF CARE
Problem: Discharge Planning  Goal: Discharge to home or other facility with appropriate resources  Outcome: Progressing  Flowsheets (Taken 3/5/2023 2000)  Discharge to home or other facility with appropriate resources: Identify barriers to discharge with patient and caregiver     Problem: Safety - Adult  Goal: Free from fall injury  Outcome: Progressing  Flowsheets (Taken 3/6/2023 0412)  Free From Fall Injury: Instruct family/caregiver on patient safety     Problem: Pain  Goal: Verbalizes/displays adequate comfort level or baseline comfort level  Outcome: Progressing     Problem: Musculoskeletal - Adult  Goal: Return mobility to safest level of function  Outcome: Progressing  Flowsheets (Taken 3/5/2023 2000)  Return Mobility to Safest Level of Function: Assess patient stability and activity tolerance for standing, transferring and ambulating with or without assistive devices  Goal: Maintain proper alignment of affected body part  Outcome: Progressing  Flowsheets (Taken 3/5/2023 2000)  Maintain proper alignment of affected body part: Support and protect limb and body alignment per provider's orders  Goal: Return ADL status to a safe level of function  Outcome: Progressing  Flowsheets (Taken 3/5/2023 2000)  Return ADL Status to a Safe Level of Function: Administer medication as ordered

## 2023-03-06 NOTE — PROGRESS NOTES
Physical Therapy  Attempt Note  Reece Abreu  [id number]    Ortho has consulted and per Dr. Shaq Ansari the pt may weightbear on her R leg in a brace locked into extension. Also the pt will be having surgery either Monday or Tuesday depending on OR availability. Spoke with the pt this am and she does not have a brace yet therefore PT eval cannot be initiated. Will await brace to be delivered or see the pt post-op depending on surgery date. If the pt has surgery prior to initiating PT eval then PT will need new orders with updated weightbearing status.   Electronically signed by Jeanie Trent, PT [id number] on 3/6/2023 at 7:44 AM

## 2023-03-07 LAB
A/G RATIO: 1.4 (ref 1.1–2.2)
ALBUMIN SERPL-MCNC: 3.7 G/DL (ref 3.4–5)
ALP BLD-CCNC: 64 U/L (ref 40–129)
ALT SERPL-CCNC: 16 U/L (ref 10–40)
ANION GAP SERPL CALCULATED.3IONS-SCNC: 12 MMOL/L (ref 3–16)
AST SERPL-CCNC: 32 U/L (ref 15–37)
BASOPHILS ABSOLUTE: 0 K/UL (ref 0–0.2)
BASOPHILS RELATIVE PERCENT: 0.1 %
BILIRUB SERPL-MCNC: 0.6 MG/DL (ref 0–1)
BUN BLDV-MCNC: 12 MG/DL (ref 7–20)
CALCIUM SERPL-MCNC: 8.6 MG/DL (ref 8.3–10.6)
CHLORIDE BLD-SCNC: 101 MMOL/L (ref 99–110)
CO2: 23 MMOL/L (ref 21–32)
CREAT SERPL-MCNC: 0.7 MG/DL (ref 0.6–1.2)
EOSINOPHILS ABSOLUTE: 0 K/UL (ref 0–0.6)
EOSINOPHILS RELATIVE PERCENT: 0 %
GFR SERPL CREATININE-BSD FRML MDRD: >60 ML/MIN/{1.73_M2}
GLUCOSE BLD-MCNC: 124 MG/DL (ref 70–99)
HCT VFR BLD CALC: 32.5 % (ref 36–48)
HEMOGLOBIN: 10.9 G/DL (ref 12–16)
LYMPHOCYTES ABSOLUTE: 1.5 K/UL (ref 1–5.1)
LYMPHOCYTES RELATIVE PERCENT: 12 %
MAGNESIUM: 2.1 MG/DL (ref 1.8–2.4)
MCH RBC QN AUTO: 30.3 PG (ref 26–34)
MCHC RBC AUTO-ENTMCNC: 33.7 G/DL (ref 31–36)
MCV RBC AUTO: 90.1 FL (ref 80–100)
MONOCYTES ABSOLUTE: 0.8 K/UL (ref 0–1.3)
MONOCYTES RELATIVE PERCENT: 6.1 %
NEUTROPHILS ABSOLUTE: 10.2 K/UL (ref 1.7–7.7)
NEUTROPHILS RELATIVE PERCENT: 81.8 %
PDW BLD-RTO: 14.3 % (ref 12.4–15.4)
PHOSPHORUS: 3.2 MG/DL (ref 2.5–4.9)
PLATELET # BLD: 257 K/UL (ref 135–450)
PMV BLD AUTO: 7.7 FL (ref 5–10.5)
POTASSIUM SERPL-SCNC: 4.1 MMOL/L (ref 3.5–5.1)
RBC # BLD: 3.61 M/UL (ref 4–5.2)
SODIUM BLD-SCNC: 136 MMOL/L (ref 136–145)
TOTAL PROTEIN: 6.4 G/DL (ref 6.4–8.2)
WBC # BLD: 12.4 K/UL (ref 4–11)

## 2023-03-07 PROCEDURE — 2580000003 HC RX 258: Performed by: ORTHOPAEDIC SURGERY

## 2023-03-07 PROCEDURE — 94760 N-INVAS EAR/PLS OXIMETRY 1: CPT

## 2023-03-07 PROCEDURE — 97161 PT EVAL LOW COMPLEX 20 MIN: CPT

## 2023-03-07 PROCEDURE — 85025 COMPLETE CBC W/AUTO DIFF WBC: CPT

## 2023-03-07 PROCEDURE — 6360000002 HC RX W HCPCS: Performed by: ORTHOPAEDIC SURGERY

## 2023-03-07 PROCEDURE — 6370000000 HC RX 637 (ALT 250 FOR IP): Performed by: ORTHOPAEDIC SURGERY

## 2023-03-07 PROCEDURE — 97166 OT EVAL MOD COMPLEX 45 MIN: CPT

## 2023-03-07 PROCEDURE — 97116 GAIT TRAINING THERAPY: CPT

## 2023-03-07 PROCEDURE — 97530 THERAPEUTIC ACTIVITIES: CPT

## 2023-03-07 PROCEDURE — 36415 COLL VENOUS BLD VENIPUNCTURE: CPT

## 2023-03-07 PROCEDURE — 1200000000 HC SEMI PRIVATE

## 2023-03-07 PROCEDURE — 84100 ASSAY OF PHOSPHORUS: CPT

## 2023-03-07 PROCEDURE — 83735 ASSAY OF MAGNESIUM: CPT

## 2023-03-07 PROCEDURE — 80053 COMPREHEN METABOLIC PANEL: CPT

## 2023-03-07 RX ORDER — OXYCODONE HYDROCHLORIDE 5 MG/1
5 TABLET ORAL EVERY 6 HOURS PRN
Qty: 20 TABLET | Refills: 0 | Status: SHIPPED | OUTPATIENT
Start: 2023-03-07 | End: 2023-03-12

## 2023-03-07 RX ORDER — ASPIRIN 81 MG/1
81 TABLET ORAL 2 TIMES DAILY
Qty: 60 TABLET | Refills: 0 | Status: SHIPPED | OUTPATIENT
Start: 2023-03-07 | End: 2023-04-06

## 2023-03-07 RX ADMIN — SODIUM CHLORIDE, PRESERVATIVE FREE 10 ML: 5 INJECTION INTRAVENOUS at 21:02

## 2023-03-07 RX ADMIN — ENOXAPARIN SODIUM 30 MG: 100 INJECTION SUBCUTANEOUS at 21:02

## 2023-03-07 RX ADMIN — DOCUSATE SODIUM 100 MG: 100 CAPSULE, LIQUID FILLED ORAL at 21:02

## 2023-03-07 RX ADMIN — ENOXAPARIN SODIUM 30 MG: 100 INJECTION SUBCUTANEOUS at 08:08

## 2023-03-07 RX ADMIN — ACETAMINOPHEN 650 MG: 325 TABLET ORAL at 18:38

## 2023-03-07 RX ADMIN — PANTOPRAZOLE SODIUM 40 MG: 40 TABLET, DELAYED RELEASE ORAL at 05:57

## 2023-03-07 RX ADMIN — CEFAZOLIN 2000 MG: 2 INJECTION, POWDER, FOR SOLUTION INTRAMUSCULAR; INTRAVENOUS at 08:22

## 2023-03-07 RX ADMIN — CEFAZOLIN 2000 MG: 2 INJECTION, POWDER, FOR SOLUTION INTRAMUSCULAR; INTRAVENOUS at 00:37

## 2023-03-07 RX ADMIN — SODIUM CHLORIDE: 9 INJECTION, SOLUTION INTRAVENOUS at 08:21

## 2023-03-07 RX ADMIN — SODIUM CHLORIDE, PRESERVATIVE FREE 10 ML: 5 INJECTION INTRAVENOUS at 08:23

## 2023-03-07 RX ADMIN — METOPROLOL SUCCINATE 25 MG: 25 TABLET, EXTENDED RELEASE ORAL at 08:08

## 2023-03-07 RX ADMIN — POLYETHYLENE GLYCOL 3350 17 G: 17 POWDER, FOR SOLUTION ORAL at 18:39

## 2023-03-07 RX ADMIN — ATORVASTATIN CALCIUM 10 MG: 10 TABLET, FILM COATED ORAL at 08:08

## 2023-03-07 ASSESSMENT — PAIN SCALES - GENERAL
PAINLEVEL_OUTOF10: 1
PAINLEVEL_OUTOF10: 0

## 2023-03-07 ASSESSMENT — PAIN DESCRIPTION - ORIENTATION: ORIENTATION: RIGHT

## 2023-03-07 ASSESSMENT — PAIN DESCRIPTION - LOCATION: LOCATION: KNEE

## 2023-03-07 ASSESSMENT — PAIN - FUNCTIONAL ASSESSMENT: PAIN_FUNCTIONAL_ASSESSMENT: ACTIVITIES ARE NOT PREVENTED

## 2023-03-07 ASSESSMENT — PAIN DESCRIPTION - DESCRIPTORS: DESCRIPTORS: THROBBING

## 2023-03-07 NOTE — CARE COORDINATION
Case Management Assessment  Initial Evaluation    Date/Time of Evaluation: 3/7/2023 2:28 PM  Assessment Completed by: [id number] DOREEN Morales    If patient is discharged prior to next notation, then this note serves as note for discharge by case management. Case Management Notes: spoke with pt at bedside who wants to go to rehab since she lives alone. SNF list provided, reviewed and [facility], spoke with Glorine Frame at [facility] who can accept and will start precert. Pt aware and excited. Patient Name: Mirian Dempsey                   YOB: 1950  Diagnosis: Leg pain [M79.606]  Fall, initial encounter [W19. XXXA]  Injury of right lower extremity, initial encounter Arthurine Pali                   Date / Time: 3/3/2023  8:35 PM    Patient Admission Status: Inpatient   Readmission Risk (Low < 19, Mod (19-27), High > 27): Readmission Risk Score: 9.3    Current PCP: No primary care provider on file. PCP verified by CM? Yes    Chart Reviewed: Yes      History Provided by: Patient  Patient Orientation: Alert and Oriented, Person, Place, Situation    Patient Cognition: Alert    Hospitalization in the last 30 days (Readmission):  No    If yes, Readmission Assessment in CM Navigator will be completed. Advance Directives:      Code Status: Full Code   Patient's Primary Decision Maker is: Legal Next of Kin    Primary Decision MakerKayla Go - Child - [phone]    Secondary Decision Maker: Lionel Nathancarolyn Child - [phone]    Discharge Planning:    Patient lives with: Alone Type of Home: House  Primary Care Giver: Self  Patient Support Systems include: Children   Current Financial resources: None  Current community resources: ECF/Home Care  Current services prior to admission: None            Current DME:              Type of Home Care services:  None    ADLS  Prior functional level:  Independent in ADLs/IADLs, Bathing, Dressing, Toileting, Feeding, Cooking, Housework, Shopping, Mobility  Current functional level: Assistance with the following:, Bathing, Dressing, Housework, Shopping, Cooking, Mobility    PT AM-PAC: 14 /24  OT AM-PAC: 16 /24    Family can provide assistance at DC: Yes  Would you like Case Management to discuss the discharge plan with any other family members/significant others, and if so, who? Yes (son Maylin Greene)  Plans to Return to Present Housing: No  Other Identified Issues/Barriers to RETURNING to current housing: need therapy, needs SNF  Potential Assistance needed at discharge: Pankaj Talbot            Potential DME: Walker  Patient expects to discharge to: [address] for transportation at discharge:      Financial    Payor: Alba Juárez / Plan: [facility] [facility] PPO / Product Type: Medicare /     Does insurance require precert for SNF: Yes    Potential assistance Purchasing Medications: No  Meds-to-Beds request: Yes      [address] [facility] [phone] [facility] [phone] [address] [facility] [address]  Phone: [phone] Fax: [phone]      Notes:    Factors facilitating achievement of predicted outcomes: Motivated    Barriers to discharge: none    Additional Case Management Notes: spoke with pt at bedside who wants to go to rehab since she lives alone. SNF list provided, reviewed and pt chose [facility], spoke with Maxx Vazquez at [facility] who can accept and will start precert. Pt aware and excited. The Plan for Transition of Care is related to the following treatment goals of Leg pain [M79.606]  Fall, initial encounter [W19. XXXA]  Injury of right lower extremity, initial encounter [K67.96OZ]    IF APPLICABLE: The Patient and/or patient representative Shelly and her family were provided with a choice of provider and agrees with the discharge plan.  Freedom of choice list with basic dialogue that supports the patient's individualized plan of care/goals and shares the quality data associated with the providers was provided to: Patient   Patient Representative Name:       The Patient and/or Patient Representative Agree with the Discharge Plan?  Yes    [address] [facility]  Case Management Department  Ph: [phone] Fax: [phone]

## 2023-03-07 NOTE — PROGRESS NOTES
Occupational Therapy  Facility/Department: [facility] MED SURG  Occupational Therapy Initial Assessment    Name: Elyse Tripp  : 1950  MRN: [id number]  Date of Service: 3/7/2023    Discharge Recommendations:  5-7 sessions per week, Patient would benefit from continued therapy after discharge  OT Equipment Recommendations  Other: TBD by d/c site     Shelly ValdezNurysScar scored a 16/24 on the AM-PAC ADL Inpatient form. Current research shows that an AM-PAC score of 17 or less is typically not associated with a discharge to the patient's home setting. Based on the patient's AM-PAC score and their current ADL deficits, it is recommended that the patient have 5-7 sessions per week of Occupational Therapy at d/c to increase the patient's independence. At this time, this patient demonstrates complex nursing, medical, and rehabilitative needs, and would benefit from intensive rehabilitation services upon discharge from the Inpatient setting. This patient demonstrates the ability to participate in and benefit from an intensive therapy program with a coordinated interdisciplinary team approach to foster frequent, structured, and documented communication among disciplines, who will work together to establish, prioritize, and achieve treatment goals. Please see assessment section for further patient specific details. If patient discharges prior to next session this note will serve as a discharge summary. Please see below for the latest assessment towards goals. Patient Diagnosis(es): The primary encounter diagnosis was Fall, initial encounter. A diagnosis of Injury of right lower extremity, initial encounter was also pertinent to this visit. Past Medical History:  has a past medical history of Hyperlipidemia and Hypertension. Past Surgical History:  has a past surgical history that includes Leg Muscle Surgery (Right, 3/6/2023).            Assessment   Performance deficits / Impairments: Decreased functional mobility ; Decreased ADL status; Decreased balance;Decreased endurance;Decreased high-level IADLs  Assessment: Pt is a 74 y.o. female admitted with Right distal quadriceps tendon tear s/p fall. Pt s/p Right distal quadriceps tendon repair per Dr. Breezy Zacarias 3/6/2023. Pt Full weight bearing on operative leg with knee immobilizer in place. At baseline, pt lives alone and is independent ADLs, IADLs, and fxl mobility with no AD. Pt currently functioning below baseline, limited in self-care by R LE pain/restrictions affecting pt's fxl mobility, fxl activity tolerance, and ADL status. This date, pt required mod A for bed mobility, Min A fxl transfers, and CGA fxl mobility with RW limited to short distance bed>chair. Pt has WFL UB ROM/stregnth for UB ADLs, but anticipate pt would require max A for LB ADLs. Pt demonstrates need for ongoing skilled OT at high frequency of 5-7 sessions/week at d/c to maximize pt's safety and independence prior to return home. Prognosis: Good  Decision Making: Medium Complexity  History: see above  REQUIRES OT FOLLOW-UP: Yes  Activity Tolerance  Activity Tolerance: Patient limited by fatigue;Patient limited by pain        Plan   Occupational Therapy Plan  Times Per Week: 3-5  Current Treatment Recommendations: Functional mobility training, Endurance training, Balance training, Strengthening, Safety education & training, Patient/Caregiver education & training, Self-Care / ADL, Equipment evaluation, education, & procurement     Restrictions  Restrictions/Precautions  Restrictions/Precautions: Fall Risk  Position Activity Restriction  Other position/activity restrictions: 1)  Up to bedside chair at least three times a day as tolerated. 2)  Ambulate in room progressing to hallway with assistive device four times daily (including PT) when PT advises. 3)  Bathroom privileges with assistance.  FWBAT with Knee Immobilizer    Subjective   General  Chart Reviewed: Yes  Patient assessed for rehabilitation services?: Yes  Additional Pertinent Hx: Pt is a 74 y.o. female admitted with Right distal quadriceps tendon tear s/p fall. Pt s/p Right distal quadriceps tendon repair per Dr. Anderson Ochoa 3/6/2023. Pt Full weight bearing on operative leg with knee immobilizer in place. Family / Caregiver Present: No  Referring Practitioner: Ryland Henderson MD  Diagnosis: Right distal quadriceps tendon tear  Subjective  Subjective: Pt met b/s for OT eval/tx. Pt in bed on arrival, agreeable to participate in therapy. Pt denies pain at rest, but c/o R knee pain with movement. Pt very pleasant and motivated. No pain at rest  Social/Functional History  Social/Functional History  Lives With: Alone  Type of Home: [address],[address]: One level (basement as well with walk in)  Home Access: Level entry (level through front)  Bathroom Shower/Tub: Tub/Shower unit  Bathroom Toilet: Handicap height  Bathroom Equipment: Grab bars in shower  Home Equipment:  (can borrow a cane if needed)  Has the patient had two or more falls in the past year or any fall with injury in the past year?: Yes (just this fall she is in for)  ADL Assistance: Independent  Homemaking Assistance: Independent  Homemaking Responsibilities: Yes  Ambulation Assistance: Independent  Transfer Assistance: Independent  Active : Yes  Occupation: Retired  Type of Occupation:   Leisure & Hobbies: gardening, bbq, decorating, shopping       Objective     Safety Devices  Type of Devices: Chair alarm in place; Left in chair;Call light within reach;Nurse notified    AROM: Within functional limits  PROM: Within functional limits  Strength:  Within functional limits  Coordination: Within functional limits  Tone: Normal    ADL  Grooming: Setup  Grooming Skilled Clinical Factors: to brush teeth/complete oral care seated in recliner  LE Dressing Skilled Clinical Factors: assist to don R sock, anticipate max A for LB dressing including R LE knee immobilizer  Toileting Skilled Clinical Factors: catheter  Additional Comments: Anticipate pt is independent feeding, set-up/SBA UB dressing/bathing, max A LB dressing/bathing and toileting based on ROM/strength, balance, endurance. Activity Tolerance  Activity Tolerance: Patient tolerated evaluation without incident;Patient limited by pain; Patient limited by fatigue    Bed mobility  Supine to Sit: Moderate assistance (HOB elevated; support R LE and assist with scooting to the edge)  Scooting: Moderate assistance (forward to EOB)    Transfers  Sit to stand: Minimal assistance  Stand to sit: Minimal assistance  Transfer Comments: to/from RW, VC's for hand placement and technique with R LE knee immobilizer    Vision  Vision: Impaired  Vision Exceptions: Wears glasses at all times  Hearing  Hearing: Within functional limits    Cognition  Overall Cognitive Status: WFL  Orientation  Overall Orientation Status: Within Functional Limits    Functional Mobility: Pt completed fxl mobility ~3 ft bed>chair with RW and CGA. Static Sitting Balance: seated EOB with SBA  Static Standing Balance: CGA at RW  Dynamic Standing Balance: CGA with RW during fxl mobility    Education Given To: Patient  Education Provided: Role of Therapy;Plan of Care;Transfer Training;ADL Adaptive Strategies;Precautions  Education Method: Demonstration;Verbal  Barriers to Learning: None  Education Outcome: Demonstrated understanding;Verbalized understanding                            AM-PAC Score        AM-PAC Inpatient Daily Activity Raw Score: 16 (03/07/23 1018)  AM-PAC Inpatient ADL T-Scale Score : 35.96 (03/07/23 1018)  ADL Inpatient CMS 0-100% Score: 53.32 (03/07/23 1018)  ADL Inpatient CMS G-Code Modifier : CK (03/07/23 1018)           Goals  Short Term Goals  Time Frame for Short Term Goals: Prior to d/c:  Short Term Goal 1: Pt will complete LB dressing/bathing with min A using A/E and DME. Short Term Goal 2: Pt will complete toileting with SBA/CGA.   Short Term Goal 3: Pt will complete fxl mobility and fxl transfers to/from ADL surfaces with SBA using AD. Short Term Goal 4: Pt will complete bed mobility with min A in prep for ADL transfer. Short Term Goal 5: Pt will increase activity tolerance to achieve the above goals. Long Term Goals  Time Frame for Long Term Goals : STGs=LTGs  Patient Goals   Patient goals : \"to get out of here as soon as possible. \"       Therapy Time   Individual Concurrent Group Co-treatment   Time In 8587         Time Out 0940         Minutes 30         Timed Code Treatment Minutes: [facility] 9, OTR/L 2922

## 2023-03-07 NOTE — PROGRESS NOTES
Hospitalist Progress Note      PCP: No primary care provider on file. Date of Admission: 3/3/2023    Subjective: pain controlled, agreeable to SNF    Medications:  Reviewed    Infusion Medications    sodium chloride 5 mL/hr at 03/07/23 6998     Scheduled Medications    enoxaparin  30 mg SubCUTAneous BID    metoprolol succinate  25 mg Oral Daily    docusate sodium  100 mg Oral BID    lidocaine  1 patch TransDERmal Daily    [Held by provider] lisinopril  20 mg Oral Daily    atorvastatin  10 mg Oral Daily    pantoprazole  40 mg Oral QAM AC    sodium chloride flush  5-40 mL IntraVENous 2 times per day     PRN Meds: acetaminophen, morphine, methocarbamol, sodium chloride flush, sodium chloride, ondansetron **OR** ondansetron, polyethylene glycol, oxyCODONE      Intake/Output Summary (Last 24 hours) at 3/7/2023 1818  Last data filed at 3/7/2023 1330  Gross per 24 hour   Intake 240 ml   Output 2150 ml   Net -1910 ml       Physical Exam Performed:    /70   Pulse 93   Temp 98.4 °F (36.9 °C) (Oral)   Resp 16   Ht 5' 1\" (1.549 m)   Wt 180 lb 12.4 oz (82 kg)   SpO2 98%   BMI 34.16 kg/m²     Gen/overall appearance: Not in acute distress. Alert. Oriented x3. Head: Normocephalic, atraumatic  Eyes: EOMI, good acuity  ENT: Oral mucosa moist  Neck: No JVD, thyromegaly  CVS: Nml S1S2, no MRG, RRR  Pulm: Clear bilaterally. No crackles/wheezes  Gastrointestinal: Soft, NT/ND, +BS  Musculoskeletal: Mild swelling of right knee. Unable to flex or extend right knee beyond about a 15 degree angle. Neuro: No focal deficit. Moves extremity spontaneously. Psychiatry: Appropriate affect. Not agitated. Skin: Warm, dry with normal turgor.  No rash  Capillary refill: Brisk,< 3 seconds   Peripheral Pulses: +2 palpable, equal bilaterally     Labs:   Recent Labs     03/05/23  0602 03/06/23  0535 03/07/23  0554   WBC 8.7 10.0 12.4*   HGB 12.6 12.3 10.9*   HCT 37.8 36.3 32.5*    254 257     Recent Labs     03/05/23  0602 03/06/23  0535 03/07/23  0554    139 136   K 4.0 3.9 4.1    101 101   CO2 25 28 23   BUN 14 12 12   CREATININE 0.9 0.8 0.7   CALCIUM 9.3 9.2 8.6   PHOS 3.1 2.6 3.2     Recent Labs     03/05/23  0602 03/06/23  0535 03/07/23  0554   AST 23 20 32   ALT 11 11 16   BILITOT 0.7 0.7 0.6   ALKPHOS 59 57 64     Recent Labs     03/05/23  0602   INR 1.02     No results for input(s): Chuck Lezama in the last 72 hours. Urinalysis:    No results found for: Carilyn Parcel, BACTERIA, RBCUA, BLOODU, [address], [address]    Radiology:  CT HEAD WO CONTRAST   Final Result   No acute intracranial abnormality. CT FEMUR RIGHT WO CONTRAST   Final Result   1. Suspected distal quadriceps tendon rupture at its insertion with   approximately 2.7 cm of retraction. 2. Overlying subcutaneous hematoma. 3. Sclerotic lesion in the right inferior pubic ramus. In the absence of a   primary malignancy history, this most likely reflects a bone island. Comparison with previous exams if available is recommended to ensure   stability. XR FEMUR RIGHT (MIN 2 VIEWS)   Final Result   No fracture. Mild soft tissue thickening of the quadriceps. IP CONSULT TO SOCIAL WORK  IP CONSULT TO ORTHOPEDIC SURGERY    Assessment/Plan:    Active Hospital Problems    Diagnosis     Quadriceps tendon rupture, right, initial encounter [G00.859L]      Priority: Medium    Leg pain [M79.606]      Priority: Medium     Right quadriceps tendon rupture. Secondary to mechanical fall. CT-right femur revealed right quadriceps tendon rupture with associated hematoma. Continue multimodal pain regimen. S/p surgery 3/6/23. Fall precautions. Patient has low RCRI score, deemed low risk for cardiac complications from noncardiac surgery. Okay to proceed with needed surgery. No anticoagulation for now due to associated hematoma. Restart anticoagulation when okay from orthopedic surgery standpoint. Right lower extremity hematoma.  Secondary to #1. Hold/avoid antiplatelets and anticoagulants for now. Fall at home, initial encounter. This appears to be mechanical in nature, as patient slipped. Patient denies hitting her head. Will obtain CT-head without contrast to rule out any intracranial pathology. Fall precautions. Therapy evaluations as noted above. Leukocytosis, present on admission. Likely reactive. This has since resolved. Other comorbidities: history of essential hypertension, hyperlipidemia, obesity with BMI of 33 kg/m². Disposition. Patient will likely need SNF placement following hospitalization. Diet: ADULT DIET;  Regular  Code Status: Full Code  PT/OT Eval Status: ordered    Dispo - cont care, poss dc to snf in am      Perri Clark MD

## 2023-03-07 NOTE — PROGRESS NOTES
Patient alert and oriented X4. Patient denies any pain at this time. Patient tolerated nightly medications well. Patient verbalizes understanding of education. Patient vital signs recorded. Fall precautions in place. Bed in lowest position, side rails X2. Bed locks on. Bed alarm on. Non slip socks on. Needed items within reach. Call light within reach.  Electronically signed by Saulo Thurston RN on 3/6/2023 at 9:07 PM

## 2023-03-07 NOTE — PLAN OF CARE
Problem: Discharge Planning  Goal: Discharge to home or other facility with appropriate resources  3/6/2023 2354 by Aretha Metcalf RN  Outcome: Progressing  Flowsheets (Taken 3/6/2023 2021)  Discharge to home or other facility with appropriate resources: Identify barriers to discharge with patient and caregiver     Problem: Safety - Adult  Goal: Free from fall injury  3/6/2023 2354 by Aretha Metcalf RN  Outcome: Progressing  Flowsheets (Taken 3/6/2023 2353)  Free From Fall Injury: Instruct family/caregiver on patient safety     Problem: Pain  Goal: Verbalizes/displays adequate comfort level or baseline comfort level  3/6/2023 2354 by Aretha Metcalf RN  Outcome: Progressing     Problem: Musculoskeletal - Adult  Goal: Return mobility to safest level of function  3/6/2023 2354 by Aretha Metcalf RN  Outcome: Progressing  Flowsheets (Taken 3/6/2023 2021)  Return Mobility to Safest Level of Function: Assess patient stability and activity tolerance for standing, transferring and ambulating with or without assistive devices     Problem: Musculoskeletal - Adult  Goal: Maintain proper alignment of affected body part  3/6/2023 2354 by Aretha Metcalf RN  Outcome: Progressing  Flowsheets (Taken 3/6/2023 2021)  Maintain proper alignment of affected body part: Support and protect limb and body alignment per provider's orders     Problem: Musculoskeletal - Adult  Goal: Return ADL status to a safe level of function  3/6/2023 2354 by Aretha Metcalf RN  Outcome: Progressing  Flowsheets (Taken 3/6/2023 2021)  Return ADL Status to a Safe Level of Function: Assess activities of daily living deficits and provide assistive devices as needed

## 2023-03-07 NOTE — DISCHARGE INSTR - COC
[facility] ([facility]) Continuity of Care Form    Patient Name:  Kam Bernal  : 1950    MRN:  [id number]    Admit date:  3/3/2023  Discharge date:  2023    Code Status Order: Full Code  Advance Directives: No    Admitting Physician: Deven Valdez MD  PCP: No primary care provider on file. Discharging Nurse: [facility] Unit/Room#: D3T-7779/2275-32  Discharging Unit Phone Number: [phone]    Emergency Contact:        Past Surgical History:  Past Surgical History:   Procedure Laterality Date    LEG MUSCLE SURGERY Right 3/6/2023    RIGHT QUADRICEPS TENDON REPAIR performed by Darcy Foreman MD at [facility]        Immunization History:   Immunization History   Administered Date(s) Administered    COVID-19, PFIZER PURPLE top, DILUTE for use, (age 15 y+), 30mcg/0.3mL 2021, 2021       Active Problems:  Principal Problem:    Quadriceps tendon rupture, right, initial encounter  Active Problems:    Leg pain  Resolved Problems:    * No resolved hospital problems. *      Isolation/Infection:       Nurse Assessment:  Last Vital Signs:/72   Pulse 93   Temp 97.6 °F (36.4 °C) (Oral)   Resp 18   Ht 5' 1\" (1.549 m)   Wt 180 lb 12.4 oz (82 kg)   SpO2 95%   BMI 34.16 kg/m²   Last documented pain score (0-10 scale): Pain Level: 0  Last Weight:   Wt Readings from Last 1 Encounters:   23 180 lb 12.4 oz (82 kg)     Mental Status:  oriented, alert, coherent, logical, thought processes intact, and able to concentrate and follow conversation     IV Access:  - ***    Nursing Mobility/ADLs:  Walking   Assisted  Transfer  Assisted  Bathing  Assisted  Dressing  Assisted  Toileting  Assisted  Feeding  Independent  Med Admin  Independent  Med Delivery   whole    Wound Care Documentation and Therapy:  Keep tan Mepilex dressing clean and intact for 7 days after surgery then remove and leave wound to air. Mepilex is waterproof for showering.          Elimination:  Urinary Catheter: None Colostomy/Ileostomy: No  Continence: Bowel: Yes  Bladder: Yes  Date of Last BM: n/a      Intake/Output Summary (Last 24 hours)     Intake/Output Summary (Last 24 hours) at 3/7/2023 1301  Last data filed at 3/7/2023 0304  Gross per 24 hour   Intake 1590 ml   Output 1850 ml   Net -260 ml     Safety Concerns:     History of Falls (last 30 days)    Impairments/Disabilities:      Vision    Nutrition Therapy:  Current Nutrition Therapy: ADULT DIET; Regular  Routes of Feeding: Oral  Liquids: No Restrictions  Daily Fluid Restriction: no  Last Modified Barium Swallow with Video (Video Swallowing Test): not done    Treatments at the Time of Hospital Discharge:   Respiratory Treatments: n/a    Oxygen Therapy:  is not on home oxygen therapy. Ventilator:    - No ventilator support    Lab orders for discharge:        Rehab Therapies: Physical Therapy, Occupational Therapy and nursing care  Weight Bearing Status/Restrictions: No weight bearing restrictions. Right knee in immobilizer all times. Remove brace daily to inspect skin for breakdown, pad brace as needed. Other Medical Equipment (for information only, NOT a DME order):walker walker  Other Treatments: ASA 81mg twice at day for 30 days for DVT prophylaxis     Patient's personal belongings (please select all that are sent with patient):  Glasses, Dentures lower    RN SIGNATURE:  Electronically signed by Linette Seth RN on 3/8/23 at 1:56 PM EST    PHYSICIAN SECTION    Prognosis: Good    Condition at Discharge: Stable    Rehab Potential (if transferring to Rehab): Good    Physician Certification: I certify the above orders, information, and transfer of Dex Sam is necessary for the continuing treatment of the diagnosis listed and that he requires [facility] for less 30 days.      Update Admission H&P: No change in H&P    PHYSICIAN SIGNATURE:  Electronically signed by ADRIANNE Boyd CNP on 3/7/23 at 1:02 PM EST/  [facility]    CASE MANAGEMENT/SOCIAL WORK SECTION    Inpatient Status Date: 03/03/2023    [place] Readmission Risk Assessment Score:    Discharging to Facility/ Agency   Name: [facility]  Address:  [address], [place], [address] [place] [address]   Phone:  [phone]  Fax:  [phone]          / signature: Electronically signed by Moises Mayfield on 3/8/23 at 10:44 AM EST          Followup  [facility] 2 weeks   [place] [address] and [facility] Medicine, [address] [place] [address],   [phone]

## 2023-03-07 NOTE — ACP (ADVANCE CARE PLANNING)
Advance Care Planning     Advance Care Planning Activator (Inpatient)  Conversation Note      Date of ACP Conversation: 3/7/2023     Conversation Conducted with: Patient with Decision Making Capacity    ACP Activator: Jv LAWRENCE Community Health Decision Maker:     Current Designated Health Care Decision Maker:     Primary Decision Maker: Chely Nuno Child - [phone]    Secondary Decision Maker: Nunu Morrison Child - [phone]    Care Preferences    Ventilation: \"If you were in your present state of health and suddenly became very ill and were unable to breathe on your own, what would your preference be about the use of a ventilator (breathing machine) if it were available to you? \"      Would the patient desire the use of ventilator (breathing machine)?: yes    \"If your health worsens and it becomes clear that your chance of recovery is unlikely, what would your preference be about the use of a ventilator (breathing machine) if it were available to you? \"     Would the patient desire the use of ventilator (breathing machine)?: no      Resuscitation  \"CPR works best to restart the heart when there is a sudden event, like a heart attack, in someone who is otherwise healthy. Unfortunately, CPR does not typically restart the heart for people who have serious health conditions or who are very sick. \"    \"In the event your heart stopped as a result of an underlying serious health condition, would you want attempts to be made to restart your heart (answer \"yes\" for attempt to resuscitate) or would you prefer a natural death (answer \"no\" for do not attempt to resuscitate)? \" yes       [] Yes   [x] No   Educated Patient / Moriah Hood regarding differences between Advance Directives and portable DNR orders.     Length of ACP Conversation in minutes:  3    Conversation Outcomes:  ACP discussion completed    Follow-up plan:    [] Schedule follow-up conversation to continue planning  [] Referred individual to Provider for additional questions/concerns   [] Advised patient/agent/surrogate to review completed ACP document and update if needed with changes in condition, patient preferences or care setting    [x] This note routed to one or more involved healthcare providers    Electronically signed by [id number] Christianson on 3/7/2023 at 2:18 PM

## 2023-03-07 NOTE — PLAN OF CARE
Problem: Discharge Planning  Goal: Discharge to home or other facility with appropriate resources  3/7/2023 1012 by Yolie Fregoso RN  Outcome: Progressing  3/6/2023 2354 by Henry Garrett RN  Outcome: Progressing  Flowsheets (Taken 3/6/2023 2021)  Discharge to home or other facility with appropriate resources: Identify barriers to discharge with patient and caregiver     Problem: Safety - Adult  Goal: Free from fall injury  3/7/2023 1012 by Yolie Fregoso RN  Outcome: Progressing  3/6/2023 2354 by Henry Garrett RN  Outcome: Progressing  Flowsheets (Taken 3/6/2023 2353)  Free From Fall Injury: Instruct family/caregiver on patient safety     Problem: Pain  Goal: Verbalizes/displays adequate comfort level or baseline comfort level  3/7/2023 1012 by Yolie Fregoso RN  Outcome: Progressing  3/6/2023 2354 by Henry Garrett RN  Outcome: Progressing     Problem: Musculoskeletal - Adult  Goal: Return mobility to safest level of function  3/7/2023 1012 by Yolie Fregoso RN  Outcome: Progressing  3/6/2023 2354 by Henry Garrett RN  Outcome: Progressing  Flowsheets (Taken 3/6/2023 2021)  Return Mobility to Safest Level of Function: Assess patient stability and activity tolerance for standing, transferring and ambulating with or without assistive devices  Goal: Maintain proper alignment of affected body part  3/7/2023 1012 by Yolie Fregoso RN  Outcome: Progressing  3/6/2023 2354 by Henry Garrett RN  Outcome: Progressing  Flowsheets (Taken 3/6/2023 2021)  Maintain proper alignment of affected body part: Support and protect limb and body alignment per provider's orders  Goal: Return ADL status to a safe level of function  3/7/2023 1012 by Yolie Fregoso RN  Outcome: Progressing  3/6/2023 2354 by Henry Garrett RN  Outcome: Progressing  Flowsheets (Taken 3/6/2023 2021)  Return ADL Status to a Safe Level of Function: Assess activities of daily living deficits and provide assistive devices as needed     Problem: ABCDS Injury Assessment  Goal: Absence of physical injury  Outcome: Progressing

## 2023-03-07 NOTE — PROGRESS NOTES
Physical Therapy  Facility/Department: [facility] SURG  Physical Therapy Initial Assessment and treatment    Name: Elly Erwin  : 1950  MRN: [id number]  Date of Service: 3/7/2023    Discharge Recommendations:Shelly FarhanAbbott scored a 14/24 on the AM-PAC short mobility form. Current research shows that an AM-PAC score of 17 or less is typically not associated with a discharge to the patient's home setting. Based on the patient's AM-PAC score and their current functional mobility deficits, it is recommended that the patient have 5-7 sessions per week of Physical Therapy at d/c to increase the patient's independence. At this time, this patient demonstrates complex nursing, medical, and rehabilitative needs, and would benefit from intensive rehabilitation services upon discharge from the Inpatient setting. This patient demonstrates the ability to participate in and benefit from an intensive therapy program with a coordinated interdisciplinary team approach to foster frequent, structured, and documented communication among disciplines, who will work together to establish, prioritize, and achieve treatment goals. Please see assessment section for further patient specific details. If patient discharges prior to next session this note will serve as a discharge summary. Please see below for the latest assessment towards goals. PT Equipment Recommendations  Equipment Needed: No      Patient Diagnosis(es): The primary encounter diagnosis was Fall, initial encounter. A diagnosis of Injury of right lower extremity, initial encounter was also pertinent to this visit. Past Medical History:  has a past medical history of Hyperlipidemia and Hypertension. Past Surgical History:  has a past surgical history that includes Leg Muscle Surgery (Right, 3/6/2023).     Assessment   Body Structures, Functions, Activity Limitations Requiring Skilled Therapeutic Intervention: Decreased functional mobility Assessment: The pt is a 75 y/o female who presents following a fall and distal quad tendon rupture. She is typically independent with mobility without AD and able to perform all ADLs, iADLs, gardens and is very active. She presents following surgical repair of tendon with knee immobilizer and requires physical assist with bed mobility. Pt unable to tolerate more than a few feet of ambulation today 2/2 fatigue. She demonstrates R LE weakness, impaired balance, impaired activity tolerance and would benefit from intensive therapy at discharge to assist her in returning to her independent PLOF. Treatment Diagnosis: impaired mobility 2/2 quad tendon rupture  Therapy Prognosis: Good  Decision Making: Medium Complexity  Requires PT Follow-Up: Yes  Activity Tolerance  Activity Tolerance: Patient tolerated evaluation without incident;Patient limited by pain; Patient limited by fatigue     Plan   Physcial Therapy Plan  General Plan: 3-5 times per week  Current Treatment Recommendations: Strengthening, Balance training, Functional mobility training, Transfer training, Gait training, Neuromuscular re-education, Safety education & training, Patient/Caregiver education & training, Therapeutic activities  Safety Devices  Type of Devices: Chair alarm in place, Left in chair, Call light within reach, Nurse notified     Restrictions  Restrictions/Precautions  Restrictions/Precautions: Fall Risk  Position Activity Restriction  Other position/activity restrictions: 1)  Up to bedside chair at least three times a day as tolerated. 2)  Ambulate in room progressing to hallway with assistive device four times daily (including PT) when PT advises. 3)  Bathroom privileges with assistance.  FWBAT with Knee Immobilizer     Subjective   Pain: No pain at rest  General  Chart Reviewed: Yes  Patient assessed for rehabilitation services?: Yes  Additional Pertinent Hx: The patient is a 74 y.o. female who sustained a traumatic tear of her distal quadriceps tendon of her right knee from a mechanical fall. She presents s/p Right distal quadriceps tendon tear repair on 3/6. Family / Caregiver Present: No  Referring Practitioner: Mark Granado MD  Diagnosis: quad tendon rupture  Follows Commands: Within Functional Limits  General Comment  Comments: The pt presents supine in bed and willing to work with therapist.  Subjective  Subjective: \"I feel good but I haven't moved yet. \"         Social/Functional History  Social/Functional History  Lives With: Alone  Type of Home: House  Home Layout: One level (basement as well with walk in)  Home Access: Level entry (level through front)  Bathroom Shower/Tub: Tub/Shower unit  Bathroom Toilet: Handicap height  Bathroom Equipment: Grab bars in shower  Home Equipment:  (can borrow a cane if needed)  Has the patient had two or more falls in the past year or any fall with injury in the past year?: Yes (just this fall she is in for)  ADL Assistance: [address] [place] [address] Avenue: Independent  Homemaking Responsibilities: Yes  Ambulation Assistance: Independent  Transfer Assistance: Independent  Active : Yes  Occupation: Retired  Type of Occupation:   Leisure & Hobbies: gardening, bbq, decorating, shopping  Vision/Hearing  Vision  Vision: Impaired  Vision Exceptions: Wears glasses at all times  Hearing  Hearing: Within functional limits    Cognition   Orientation  Overall Orientation Status: Within Functional Limits  Cognition  Overall Cognitive Status: WFL     Objective   Heart Rate: 93  Heart Rate Source: Monitor  BP: 129/72  BP Location: Left upper arm  BP Method: Automatic  Patient Position: Semi fowlers  MAP (Calculated): 91  Resp: 18  SpO2: 95 %  O2 Device: None (Room air)       Strength RLE  Strength RLE: Exception  Comment: weak 2/2 surgery; in knee immobilizer  Strength LLE  Strength LLE: WFL    Bed mobility  Supine to Sit: Moderate assistance (HOB elevated; support R LE and assist with scooting to the edge)  Scooting: Moderate assistance (forward to EOB)  Transfers  Sit to Stand: Minimal Assistance (from EOB)  Stand to Sit: Contact guard assistance  Comment: to RW with cueing for LE placement and hand placement  Ambulation  Surface: Level tile  Device: Rolling Walker  Assistance: Contact guard assistance  Quality of Gait: slow aracelis, step to gait with antalgic of R LE, in knee immobilizer so no knee flexion on R LE  Distance: 6'  Comments: pt steady with mobility; pt declining further ambulation due to fatigue  More Ambulation?: No     Balance  Sitting - Static: Good  Sitting - Dynamic: Fair  Standing - Static: Fair  Standing - Dynamic: Fair;-  Comments: Pt CGA with RW and SBA sitting EOB    AM-PAC Score  AM-PAC Inpatient Mobility Raw Score : 14 (03/07/23 0953)  AM-PAC Inpatient T-Scale Score : 38.1 (03/07/23 0953)  Mobility Inpatient CMS 0-100% Score: 61.29 (03/07/23 0953)  Mobility Inpatient CMS G-Code Modifier : CL (03/07/23 9597)   Treatment:  Functional mobility training and pt education    Goals  Short Term Goals  Time Frame for Short Term Goals: By discharge  Short Term Goal 1: The pt will perform bed mobility with supervision  Short Term Goal 2: The pt will transfer with supervision and RW  Short Term Goal 3: The pt will ambulate with RW x 50' and SBA  Patient Goals   Patient Goals :  To go home as soon as possible       Education  Patient Education  Education Given To: Patient  Education Provided: Role of Therapy;Plan of Care  Education Method: Verbal  Barriers to Learning: None  Education Outcome: Verbalized understanding;Continued education needed      Therapy Time   Individual Concurrent Group Co-treatment   Time In 0910         Time Out 0940         Minutes 30         Timed Code Treatment Minutes: 15 Minutes   Timed Code Treatment Minutes:  15 Minutes    Total Treatment Minutes:  30 minutes      Debra Hendrickson, PT

## 2023-03-07 NOTE — PROGRESS NOTES
[facility] Orthopedic Surgery   Progress Note      S/P :  SUBJECTIVE  in recliner. Walked in room with staff RN and myself with minimal assistance. Does require cues and assistance standup or sit down. . Pain is   described in right knee and with the intensity of moderate. Pain is described as aching.       OBJECTIVE              Physical                      VITALS:  /72   Pulse 93   Temp 97.6 °F (36.4 °C) (Oral)   Resp 18   Ht 5' 1\" (1.549 m)   Wt 180 lb 12.4 oz (82 kg)   SpO2 95%   BMI 34.16 kg/m²                     MUSCULOSKELETAL:  right foot NVI. Wiggles toes to command. Able to plantarflex and dorsiflex ankle Pedal pulses are palpable.                    NEUROLOGIC:                                  Sensory:  Touch:  Right Lower Extremity:  normal                                                 Surgical wound appears clean and dry right knee with ACE and knee immobilizer on. Denies pain or discomfort from brace.     Data       CBC:   Lab Results   Component Value Date/Time    WBC 12.4 03/07/2023 05:54 AM    RBC 3.61 03/07/2023 05:54 AM    HGB 10.9 03/07/2023 05:54 AM    HCT 32.5 03/07/2023 05:54 AM    MCV 90.1 03/07/2023 05:54 AM    MCH 30.3 03/07/2023 05:54 AM    MCHC 33.7 03/07/2023 05:54 AM    RDW 14.3 03/07/2023 05:54 AM     03/07/2023 05:54 AM    MPV 7.7 03/07/2023 05:54 AM        WBC:    Lab Results   Component Value Date/Time    WBC 12.4 03/07/2023 05:54 AM        Hemoglobin/Hematocrit:    Lab Results   Component Value Date/Time    HGB 10.9 03/07/2023 05:54 AM    HCT 32.5 03/07/2023 05:54 AM        PT/INR:    Lab Results   Component Value Date/Time    PROTIME 13.3 03/05/2023 06:02 AM    INR 1.02 03/05/2023 06:02 AM              Current Inpatient Medications             Current Facility-Administered Medications: enoxaparin Sodium (LOVENOX) injection 30 mg, 30 mg, SubCUTAneous, BID  acetaminophen (TYLENOL) tablet 650 mg, 650 mg, Oral, Q6H PRN  metoprolol succinate (TOPROL XL) extended  release tablet 25 mg, 25 mg, Oral, Daily  docusate sodium (COLACE) capsule 100 mg, 100 mg, Oral, BID  lidocaine 4 % external patch 1 patch, 1 patch, TransDERmal, Daily  morphine (PF) injection 2 mg, 2 mg, IntraVENous, Q4H PRN  [Held by provider] lisinopril (PRINIVIL;ZESTRIL) tablet 20 mg, 20 mg, Oral, Daily  atorvastatin (LIPITOR) tablet 10 mg, 10 mg, Oral, Daily  pantoprazole (PROTONIX) tablet 40 mg, 40 mg, Oral, QAM AC  methocarbamol (ROBAXIN) tablet 500 mg, 500 mg, Oral, 4x Daily PRN  sodium chloride flush 0.9 % injection 5-40 mL, 5-40 mL, IntraVENous, 2 times per day  sodium chloride flush 0.9 % injection 5-40 mL, 5-40 mL, IntraVENous, PRN  0.9 % sodium chloride infusion, , IntraVENous, PRN  ondansetron (ZOFRAN-ODT) disintegrating tablet 4 mg, 4 mg, Oral, Q8H PRN **OR** ondansetron (ZOFRAN) injection 4 mg, 4 mg, IntraVENous, Q6H PRN  polyethylene glycol (GLYCOLAX) packet 17 g, 17 g, Oral, Daily PRN  oxyCODONE (ROXICODONE) immediate release tablet 5 mg, 5 mg, Oral, Q4H PRN    ASSESSMENT AND PLAN    Right leg pain  Right distal quadricep rupture  Post right distal quad repair per Dr Meera Tellez yesterday, Stable exam  WBAT in knee immobilizer, check skin at proximal and distal brace for skin breakdown  Lovenox as inpt then switch to ASA 81mg twice at day for 30 days for DVT prophylaxis   SS for DC planning  Followup Dr Meera Tellez 2 weeks postop      Sue Cerrato, APRN - CNP  3/7/2023  12:55 PM

## 2023-03-08 VITALS
BODY MASS INDEX: 33.22 KG/M2 | TEMPERATURE: 98 F | DIASTOLIC BLOOD PRESSURE: 68 MMHG | RESPIRATION RATE: 16 BRPM | WEIGHT: 175.93 LBS | HEART RATE: 110 BPM | OXYGEN SATURATION: 95 % | SYSTOLIC BLOOD PRESSURE: 114 MMHG | HEIGHT: 61 IN

## 2023-03-08 LAB
A/G RATIO: 1.3 (ref 1.1–2.2)
ALBUMIN SERPL-MCNC: 3.6 G/DL (ref 3.4–5)
ALP BLD-CCNC: 84 U/L (ref 40–129)
ALT SERPL-CCNC: 43 U/L (ref 10–40)
ANION GAP SERPL CALCULATED.3IONS-SCNC: 12 MMOL/L (ref 3–16)
AST SERPL-CCNC: 66 U/L (ref 15–37)
BASOPHILS ABSOLUTE: 0 K/UL (ref 0–0.2)
BASOPHILS RELATIVE PERCENT: 0.2 %
BILIRUB SERPL-MCNC: 1.1 MG/DL (ref 0–1)
BUN BLDV-MCNC: 11 MG/DL (ref 7–20)
CALCIUM SERPL-MCNC: 8.8 MG/DL (ref 8.3–10.6)
CHLORIDE BLD-SCNC: 100 MMOL/L (ref 99–110)
CO2: 24 MMOL/L (ref 21–32)
CREAT SERPL-MCNC: 0.6 MG/DL (ref 0.6–1.2)
EOSINOPHILS ABSOLUTE: 0.1 K/UL (ref 0–0.6)
EOSINOPHILS RELATIVE PERCENT: 0.8 %
GFR SERPL CREATININE-BSD FRML MDRD: >60 ML/MIN/{1.73_M2}
GLUCOSE BLD-MCNC: 104 MG/DL (ref 70–99)
HCT VFR BLD CALC: 31.9 % (ref 36–48)
HEMOGLOBIN: 10.5 G/DL (ref 12–16)
LYMPHOCYTES ABSOLUTE: 2.4 K/UL (ref 1–5.1)
LYMPHOCYTES RELATIVE PERCENT: 20.6 %
MAGNESIUM: 2.1 MG/DL (ref 1.8–2.4)
MCH RBC QN AUTO: 29.7 PG (ref 26–34)
MCHC RBC AUTO-ENTMCNC: 32.8 G/DL (ref 31–36)
MCV RBC AUTO: 90.5 FL (ref 80–100)
MONOCYTES ABSOLUTE: 0.8 K/UL (ref 0–1.3)
MONOCYTES RELATIVE PERCENT: 7.3 %
NEUTROPHILS ABSOLUTE: 8.2 K/UL (ref 1.7–7.7)
NEUTROPHILS RELATIVE PERCENT: 71.1 %
PDW BLD-RTO: 14.5 % (ref 12.4–15.4)
PHOSPHORUS: 2.7 MG/DL (ref 2.5–4.9)
PLATELET # BLD: 262 K/UL (ref 135–450)
PMV BLD AUTO: 7.6 FL (ref 5–10.5)
POTASSIUM SERPL-SCNC: 3.9 MMOL/L (ref 3.5–5.1)
RBC # BLD: 3.53 M/UL (ref 4–5.2)
SARS-COV-2, NAAT: NOT DETECTED
SODIUM BLD-SCNC: 136 MMOL/L (ref 136–145)
TOTAL PROTEIN: 6.4 G/DL (ref 6.4–8.2)
WBC # BLD: 11.6 K/UL (ref 4–11)

## 2023-03-08 PROCEDURE — 87635 SARS-COV-2 COVID-19 AMP PRB: CPT

## 2023-03-08 PROCEDURE — 97116 GAIT TRAINING THERAPY: CPT

## 2023-03-08 PROCEDURE — 97110 THERAPEUTIC EXERCISES: CPT

## 2023-03-08 PROCEDURE — 36415 COLL VENOUS BLD VENIPUNCTURE: CPT

## 2023-03-08 PROCEDURE — 94760 N-INVAS EAR/PLS OXIMETRY 1: CPT

## 2023-03-08 PROCEDURE — 97530 THERAPEUTIC ACTIVITIES: CPT

## 2023-03-08 PROCEDURE — 6370000000 HC RX 637 (ALT 250 FOR IP): Performed by: ORTHOPAEDIC SURGERY

## 2023-03-08 PROCEDURE — 83735 ASSAY OF MAGNESIUM: CPT

## 2023-03-08 PROCEDURE — 84100 ASSAY OF PHOSPHORUS: CPT

## 2023-03-08 PROCEDURE — 80053 COMPREHEN METABOLIC PANEL: CPT

## 2023-03-08 PROCEDURE — 6360000002 HC RX W HCPCS: Performed by: ORTHOPAEDIC SURGERY

## 2023-03-08 PROCEDURE — 85025 COMPLETE CBC W/AUTO DIFF WBC: CPT

## 2023-03-08 PROCEDURE — 2580000003 HC RX 258: Performed by: ORTHOPAEDIC SURGERY

## 2023-03-08 RX ORDER — PSEUDOEPHEDRINE HCL 30 MG
100 TABLET ORAL 2 TIMES DAILY
Qty: 60 CAPSULE | Refills: 2 | Status: SHIPPED | OUTPATIENT
Start: 2023-03-08

## 2023-03-08 RX ORDER — POLYETHYLENE GLYCOL 3350 17 G/17G
17 POWDER, FOR SOLUTION ORAL DAILY PRN
Qty: 527 G | Refills: 1 | Status: SHIPPED | OUTPATIENT
Start: 2023-03-08 | End: 2023-04-07

## 2023-03-08 RX ORDER — METHOCARBAMOL 500 MG/1
500 TABLET, FILM COATED ORAL 4 TIMES DAILY PRN
Qty: 40 TABLET | Refills: 0 | Status: SHIPPED | OUTPATIENT
Start: 2023-03-08 | End: 2023-03-18

## 2023-03-08 RX ORDER — PANTOPRAZOLE SODIUM 40 MG/1
40 TABLET, DELAYED RELEASE ORAL
Qty: 30 TABLET | Refills: 3 | Status: SHIPPED | OUTPATIENT
Start: 2023-03-09

## 2023-03-08 RX ADMIN — SODIUM CHLORIDE, PRESERVATIVE FREE 10 ML: 5 INJECTION INTRAVENOUS at 08:58

## 2023-03-08 RX ADMIN — METOPROLOL SUCCINATE 25 MG: 25 TABLET, EXTENDED RELEASE ORAL at 08:57

## 2023-03-08 RX ADMIN — PANTOPRAZOLE SODIUM 40 MG: 40 TABLET, DELAYED RELEASE ORAL at 06:11

## 2023-03-08 RX ADMIN — ATORVASTATIN CALCIUM 10 MG: 10 TABLET, FILM COATED ORAL at 08:57

## 2023-03-08 RX ADMIN — DOCUSATE SODIUM 100 MG: 100 CAPSULE, LIQUID FILLED ORAL at 08:57

## 2023-03-08 RX ADMIN — ENOXAPARIN SODIUM 30 MG: 100 INJECTION SUBCUTANEOUS at 08:58

## 2023-03-08 ASSESSMENT — PAIN SCALES - GENERAL
PAINLEVEL_OUTOF10: 1
PAINLEVEL_OUTOF10: 2

## 2023-03-08 ASSESSMENT — PAIN DESCRIPTION - ORIENTATION: ORIENTATION: RIGHT

## 2023-03-08 ASSESSMENT — PAIN DESCRIPTION - DESCRIPTORS: DESCRIPTORS: DISCOMFORT

## 2023-03-08 ASSESSMENT — PAIN DESCRIPTION - LOCATION: LOCATION: KNEE;LEG

## 2023-03-08 NOTE — PROGRESS NOTES
[id number] [facility] Orthopedic Surgery   Progress Note      S/P :  SUBJECTIVE  in recliner. Alert and oriented. . Pain is   described in right knee and with the intensity of moderate. Pain is described as aching. OBJECTIVE              Physical                      VITALS:  /68   Pulse (!) 110   Temp 98 °F (36.7 °C) (Oral)   Resp 16   Ht 5' 1\" (1.549 m)   Wt 175 lb 14.8 oz (79.8 kg)   SpO2 95%   BMI 33.24 kg/m²                     MUSCULOSKELETAL:  right foot NVI. Wiggles toes to command. Able to plantarflex and dorsiflex ankle Pedal pulses are palpable. NEUROLOGIC:                                  Sensory:  Touch:  Right Lower Extremity:  normal                                                 Surgical wound appears clean and dry right leg with dressing covered by ACE foot to thigh. Knee immobilizer on. I padded posterior brace at ankle and thigh for comfort with sponges. Assisted pt to don pants.      Data       CBC:   Lab Results   Component Value Date/Time    WBC 11.6 03/08/2023 05:22 AM    RBC 3.53 03/08/2023 05:22 AM    HGB 10.5 03/08/2023 05:22 AM    HCT 31.9 03/08/2023 05:22 AM    MCV 90.5 03/08/2023 05:22 AM    MCH 29.7 03/08/2023 05:22 AM    MCHC 32.8 03/08/2023 05:22 AM    RDW 14.5 03/08/2023 05:22 AM     03/08/2023 05:22 AM    MPV 7.6 03/08/2023 05:22 AM        WBC:    Lab Results   Component Value Date/Time    WBC 11.6 03/08/2023 05:22 AM        Hemoglobin/Hematocrit:    Lab Results   Component Value Date/Time    HGB 10.5 03/08/2023 05:22 AM    HCT 31.9 03/08/2023 05:22 AM        PT/INR:    Lab Results   Component Value Date/Time    PROTIME 13.3 03/05/2023 06:02 AM    INR 1.02 03/05/2023 06:02 AM              Current Inpatient Medications             Current Facility-Administered Medications: enoxaparin Sodium (LOVENOX) injection 30 mg, 30 mg, SubCUTAneous, BID  acetaminophen (TYLENOL) tablet 650 mg, 650 mg, Oral, Q6H PRN  metoprolol succinate (TOPROL XL) extended release tablet 25 mg, 25 mg, Oral, Daily  docusate sodium (COLACE) capsule 100 mg, 100 mg, Oral, BID  lidocaine 4 % external patch 1 patch, 1 patch, TransDERmal, Daily  morphine (PF) injection 2 mg, 2 mg, IntraVENous, Q4H PRN  [Held by provider] lisinopril (PRINIVIL;ZESTRIL) tablet 20 mg, 20 mg, Oral, Daily  atorvastatin (LIPITOR) tablet 10 mg, 10 mg, Oral, Daily  pantoprazole (PROTONIX) tablet 40 mg, 40 mg, Oral, QAM AC  methocarbamol (ROBAXIN) tablet 500 mg, 500 mg, Oral, 4x Daily PRN  sodium chloride flush 0.9 % injection 5-40 mL, 5-40 mL, IntraVENous, 2 times per day  sodium chloride flush 0.9 % injection 5-40 mL, 5-40 mL, IntraVENous, PRN  0.9 % sodium chloride infusion, , IntraVENous, PRN  ondansetron (ZOFRAN-ODT) disintegrating tablet 4 mg, 4 mg, Oral, Q8H PRN **OR** ondansetron (ZOFRAN) injection 4 mg, 4 mg, IntraVENous, Q6H PRN  polyethylene glycol (GLYCOLAX) packet 17 g, 17 g, Oral, Daily PRN  oxyCODONE (ROXICODONE) immediate release tablet 5 mg, 5 mg, Oral, Q4H PRN    ASSESSMENT AND PLAN    Right leg pain  Right distal quadricep rupture  Post right distal quad repair per Dr Primo Tinsley yesterday, Stable exam  WBAT in knee immobilizer, check skin at proximal and distal brace for skin breakdown  Lovenox as inpt then switch to ASA 81mg twice at day for 30 days for DVT prophylaxis   SS for DC planning  Followup Dr Primo Tinsley 2 weeks postop      Vee Quick, ADRIANNE - CNP  3/8/2023  10:05 AM

## 2023-03-08 NOTE — PROGRESS NOTES
Occupational Therapy  [facility] MED SURG  Occupational Therapy Daily Treatment Note    Name: Elly Erwin  : 1950  MRN: [id number]  Date of Service: 3/8/2023    Discharge Recommendations:  5-7 sessions per week, Patient would benefit from continued therapy after discharge  OT Equipment Recommendations  Other: TBD by d/c site     Shelly Natalie scored a 17/24 on the AM-PAC ADL Inpatient form. Current research shows that an AM-PAC score of 17 or less is typically not associated with a discharge to the patient's home setting. Based on the patient's AM-PAC score and their current ADL deficits, it is recommended that the patient have 5-7 sessions per week of Occupational Therapy at d/c to increase the patient's independence. At this time, this patient demonstrates complex nursing, medical, and rehabilitative needs, and would benefit from intensive rehabilitation services upon discharge from the Inpatient setting. This patient demonstrates the ability to participate in and benefit from an intensive therapy program with a coordinated interdisciplinary team approach to foster frequent, structured, and documented communication among disciplines, who will work together to establish, prioritize, and achieve treatment goals. Please see assessment section for further patient specific details. If patient discharges prior to next session this note will serve as a discharge summary. Please see below for the latest assessment towards goals. Patient Diagnosis(es): The primary encounter diagnosis was Fall, initial encounter. Diagnoses of Injury of right lower extremity, initial encounter and Quadriceps tendon rupture, right, initial encounter were also pertinent to this visit. Past Medical History:  has a past medical history of Hyperlipidemia and Hypertension. Past Surgical History:  has a past surgical history that includes Leg Muscle Surgery (Right, 3/6/2023). Assessment   Performance deficits / Impairments: Decreased functional mobility ; Decreased ADL status; Decreased balance;Decreased endurance;Decreased high-level IADLs  Assessment: Pt is a 74 y.o. female admitted with Right distal quadriceps tendon tear s/p fall. Pt s/p Right distal quadriceps tendon repair per Dr. Khalida Lemos 3/6/2023. Pt Full weight bearing on operative leg with knee immobilizer in place. At baseline, pt lives alone and is independent ADLs, IADLs, and fxl mobility with no AD. Pt currently functioning below baseline, limited in self-care by R LE pain/restrictions affecting pt's fxl mobility, fxl activity tolerance, and ADL status. Today-pt demo'd increased fxl transfers with CGA. Pt increased standing tolerance/balance to complete fxl mobility further distances with RW and CGA, and to stand at sink for grooming with SBA. Pt also increased toileting to CGA, but still needs max A for LB ADLs d/t R LE pain/restrictions. Pt educated on use of A/E to assist with LB dressing. Pt demonstrates need for ongoing skilled OT at high frequency of 5-7 sessions/week at d/c to maximize pt's safety and independence prior to return home.   Prognosis: Good  History: see above  REQUIRES OT FOLLOW-UP: Yes  Activity Tolerance  Activity Tolerance: Patient Tolerated treatment well        Plan   Occupational Therapy Plan  Times Per Week: 3-5  Current Treatment Recommendations: Functional mobility training, Endurance training, Balance training, Strengthening, Safety education & training, Patient/Caregiver education & training, Self-Care / ADL, Equipment evaluation, education, & procurement     Restrictions  Restrictions/Precautions  Restrictions/Precautions: Fall Risk, Weight Bearing  Required Braces or Orthoses?: Yes  Lower Extremity Weight Bearing Restrictions  Right Lower Extremity Weight Bearing: Weight Bearing As Tolerated  Required Braces or Orthoses  Right Lower Extremity Brace: Knee Immobilizer  Position Activity Restriction  Other position/activity restrictions: 1)  Up to bedside chair at least three times a day as tolerated. 2)  Ambulate in room progressing to hallway with assistive device four times daily (including PT) when PT advises. 3)  Bathroom privileges with assistance. FWBAT with Knee Immobilizer    Subjective   General  Chart Reviewed: Yes  Patient assessed for rehabilitation services?: Yes  Additional Pertinent Hx: Pt is a 74 y.o. female admitted with Right distal quadriceps tendon tear s/p fall. Pt s/p Right distal quadriceps tendon repair per Dr. Tesha Kenney 3/6/2023. Pt Full weight bearing on operative leg with knee immobilizer in place. Family / Caregiver Present: Yes (granddaughter)  Referring Practitioner: Marlyn Ruiz MD  Diagnosis: Right distal quadriceps tendon tear  Subjective  Subjective: Pt met b/s for OT tx. Pt seated in recliner on arrival, agreeable to participate in therapy. Pt reports 1/10 pain R knee. Pt reports she is being discharged today. Social/Functional History  Social/Functional History  Lives With: Alone  Type of Home: House  Home Layout: One level (basement as well with walk in)  Home Access: Level entry (level through front)  Bathroom Shower/Tub: Tub/Shower unit  Bathroom Toilet: Handicap height  Bathroom Equipment: Grab bars in shower  Home Equipment:  (can borrow a cane if needed)  Has the patient had two or more falls in the past year or any fall with injury in the past year?: Yes (just this fall she is in for)  ADL Assistance: Independent  Homemaking Assistance: Independent  Homemaking Responsibilities: Yes  Ambulation Assistance: Independent  Transfer Assistance: Independent  Active : Yes  Occupation: Retired  Type of Occupation:   Leisure & Hobbies: gardening, bbq, decorating, shopping       Objective     Observation/Palpation  Posture: Good  Observation: knee immobilizer appropriately in place    Safety Devices  Type of Devices: Chair alarm in place; Left in chair;Call light within reach;Nurse notified  Restraints  Restraints Initially in Place: No    Transfers  Sit to stand: Contact guard assistance  Stand to sit: Contact guard assistance  Transfer Comments: to/from [employer] Transfers  Toilet - Technique: Ambulating  Equipment Used: Grab bars (grab bar on L, BSC arm placed on R for additional UE support with transfer)  Toilet Transfer: Contact guard assistance    ADL  Grooming: Stand by assistance  Grooming Skilled Clinical Factors: standing at sink to wash hands, use mouthwash  UE Dressing: Minimal assistance  UE Dressing Skilled Clinical Factors: to [facility] gown and don undershirt and shirt  LE Dressing Skilled Clinical Factors: pt educated on A/E to assist with LB dressing (sock aid, reacher, shoe horn) to compensate for ROM limitations/precautions R LE. Pt verbalized understanding  Toileting: Contact guard assistance  Toileting Skilled Clinical Factors: to void urine at commode, CGA to manage clothing down/up, pericare in seated supervision    Activity Tolerance  Activity Tolerance: Patient tolerated treatment well  Activity Tolerance Comments: highly motivated    Bed mobility  Supine to Sit: Unable to assess  Sit to Supine: Unable to assess  Scooting: Unable to assess  Bed Mobility Comments: Up in BS chair pre/post session    Cognition  Overall Cognitive Status: WFL  Orientation  Overall Orientation Status: Within Functional Limits    Pressure Relief Exercises: chair-push up x5  Functional Mobility: Pt completed fxl mobility ~25 ft x2 with RW and CGA.   Static Sitting Balance: seated on toilet independently  Static Standing Balance: SBA/CGA at RW and SBA at sink  Dynamic Standing Balance: CGA with RW during fxl mobility    Education Given To: Patient  Education Provided: Role of Therapy;Plan of Care;Transfer Training;ADL Adaptive Strategies;Precautions  Education Method: Demonstration;Verbal  Barriers to Learning: None  Education Outcome: Demonstrated understanding;Verbalized understanding                                                           AM-PAC Score        AM-PAC Inpatient Daily Activity Raw Score: 17 (03/08/23 1351)  AM-PAC Inpatient ADL T-Scale Score : 37.26 (03/08/23 1351)  ADL Inpatient CMS 0-100% Score: 50.11 (03/08/23 1351)  ADL Inpatient CMS G-Code Modifier : CK (03/08/23 1351)           Goals  Short Term Goals  Time Frame for Short Term Goals: Prior to d/c: status goals 3/8: all goals ongoing  Short Term Goal 1: Pt will complete LB dressing/bathing with min A using A/E and DME. Short Term Goal 2: Pt will complete toileting with SBA/CGA. Short Term Goal 3: Pt will complete fxl mobility and fxl transfers to/from ADL surfaces with SBA using AD. Short Term Goal 4: Pt will complete bed mobility with min A in prep for ADL transfer. Short Term Goal 5: Pt will increase activity tolerance to achieve the above goals. Long Term Goals  Time Frame for Long Term Goals : STGs=LTGs  Patient Goals   Patient goals : \"to get out of here as soon as possible. \"       Therapy Time   Individual Concurrent Group Co-treatment   Time In 9613         Time Out 1340         Minutes [place], [place] 4393

## 2023-03-08 NOTE — PLAN OF CARE
Problem: Discharge Planning  Goal: Discharge to home or other facility with appropriate resources  3/8/2023 0951 by Stephanie Garzon RN  Outcome: Completed  3/8/2023 0951 by Stephanie Garzon RN  Outcome: Progressing  3/8/2023 0145 by Elda Garnett RN  Outcome: Progressing  Flowsheets (Taken 3/7/2023 2102)  Discharge to home or other facility with appropriate resources:   Identify barriers to discharge with patient and caregiver   Arrange for needed discharge resources and transportation as appropriate     Problem: Safety - Adult  Goal: Free from fall injury  3/8/2023 0951 by Stephanie Garzon RN  Outcome: Completed  3/8/2023 0951 by Stephanie Garzon RN  Outcome: Progressing  3/8/2023 0145 by Elda Garnett RN  Outcome: Progressing     Problem: Pain  Goal: Verbalizes/displays adequate comfort level or baseline comfort level  3/8/2023 0951 by Stephanie Garzon RN  Outcome: Completed  3/8/2023 0951 by Stephanie Garzon RN  Outcome: Progressing  3/8/2023 0145 by Elda Garnett RN  Outcome: Progressing     Problem: Musculoskeletal - Adult  Goal: Return mobility to safest level of function  3/8/2023 0951 by Stephanie Garzon RN  Outcome: Completed  3/8/2023 0951 by Stephanie Garzon RN  Outcome: Progressing  3/8/2023 0145 by Elda Garnett RN  Outcome: Progressing  Flowsheets (Taken 3/7/2023 2102)  Return Mobility to Safest Level of Function:   Assess patient stability and activity tolerance for standing, transferring and ambulating with or without assistive devices   Assist with transfers and ambulation using safe patient handling equipment as needed   Ensure adequate protection for wounds/incisions during mobilization   Obtain physical therapy/occupational therapy consults as needed   Instruct patient/family in ordered activity level  Goal: Maintain proper alignment of affected body part  3/8/2023 0951 by Stephanie Garzon RN  Outcome: Completed  3/8/2023 0951 by Stephanie Garzon RN  Outcome: Progressing  3/8/2023 0145 by Elda Garnett RN  Outcome: Progressing  Flowsheets (Taken 3/7/2023 2102)  Maintain proper alignment of affected body part:   Support and protect limb and body alignment per provider's orders   Instruct and reinforce with patient and family use of appropriate assistive device and precautions (e.g. spinal or hip dislocation precautions)  Goal: Return ADL status to a safe level of function  3/8/2023 0951 by Moisés Gould RN  Outcome: Completed  3/8/2023 0951 by Moisés Gould RN  Outcome: Progressing  3/8/2023 0145 by Miranda Price RN  Outcome: Progressing  Flowsheets (Taken 3/7/2023 2102)  Return ADL Status to a Safe Level of Function:   Administer medication as ordered   Assess activities of daily living deficits and provide assistive devices as needed   Obtain physical therapy/occupational therapy consults as needed   Assist and instruct patient to increase activity and self care as tolerated     Problem: ABCDS Injury Assessment  Goal: Absence of physical injury  3/8/2023 0951 by Moisés Gould RN  Outcome: Completed  3/8/2023 0951 by Moisés Gould RN  Outcome: Progressing  3/8/2023 0145 by Miranda Price RN  Outcome: Progressing     Problem: Skin/Tissue Integrity - Adult  Goal: Skin integrity remains intact  3/8/2023 0951 by Moisés Gould RN  Outcome: Completed  3/8/2023 0951 by Moisés Gould RN  Outcome: Progressing  3/8/2023 0145 by Miranda Price RN  Outcome: Progressing  Flowsheets (Taken 3/7/2023 2102)  Skin Integrity Remains Intact:   Monitor for areas of redness and/or skin breakdown   Assess vascular access sites hourly  Goal: Incisions, wounds, or drain sites healing without S/S of infection  3/8/2023 0951 by Moisés Gould RN  Outcome: Completed  3/8/2023 0951 by Moisés Gould RN  Outcome: Progressing  3/8/2023 0145 by Miranda Price RN  Outcome: Progressing  Flowsheets (Taken 3/7/2023 2102)  Incisions, Wounds, or Drain Sites Healing Without Sign and Symptoms of Infection: TWICE DAILY: Assess and document skin integrity     Problem:  Gastrointestinal - Adult  Goal: Maintains or returns to baseline bowel function  3/8/2023 0951 by Altagracia Le RN  Outcome: Completed  3/8/2023 0951 by Altagracia Le RN  Outcome: Progressing  3/8/2023 0145 by Karina Araya RN  Outcome: Progressing  Flowsheets (Taken 3/7/2023 2102)  Maintains or returns to baseline bowel function:   Assess bowel function   Encourage oral fluids to ensure adequate hydration   Administer ordered medications as needed   Encourage mobilization and activity

## 2023-03-08 NOTE — FLOWSHEET NOTE
Slept fair. Up to [facility] x1 during night. Immobilizer in place to RLE. Pain controlled. Heels elevated.   Alarm on for safety, denies any needs this AM.

## 2023-03-08 NOTE — DISCHARGE INSTRUCTIONS
WBAT in knee immobilizer, check skin at proximal and distal brace for skin breakdown. [place]art immobilizer at all times even in bed.

## 2023-03-08 NOTE — PLAN OF CARE
Problem: Discharge Planning  Goal: Discharge to home or other facility with appropriate resources  Outcome: Progressing  Flowsheets (Taken 3/7/2023 2102)  Discharge to home or other facility with appropriate resources:   Identify barriers to discharge with patient and caregiver   Arrange for needed discharge resources and transportation as appropriate     Problem: Safety - Adult  Goal: Free from fall injury  Outcome: Progressing     Problem: Pain  Goal: Verbalizes/displays adequate comfort level or baseline comfort level  Outcome: Progressing     Problem: Musculoskeletal - Adult  Goal: Return mobility to safest level of function  Outcome: Progressing  Flowsheets (Taken 3/7/2023 2102)  Return Mobility to Safest Level of Function:   Assess patient stability and activity tolerance for standing, transferring and ambulating with or without assistive devices   Assist with transfers and ambulation using safe patient handling equipment as needed   Ensure adequate protection for wounds/incisions during mobilization   Obtain physical therapy/occupational therapy consults as needed   Instruct patient/family in ordered activity level  Goal: Maintain proper alignment of affected body part  Outcome: Progressing  Flowsheets (Taken 3/7/2023 2102)  Maintain proper alignment of affected body part:   Support and protect limb and body alignment per provider's orders   Instruct and reinforce with patient and family use of appropriate assistive device and precautions (e.g. spinal or hip dislocation precautions)  Goal: Return ADL status to a safe level of function  Outcome: Progressing  Flowsheets (Taken 3/7/2023 2102)  Return ADL Status to a Safe Level of Function:   Administer medication as ordered   Assess activities of daily living deficits and provide assistive devices as needed   Obtain physical therapy/occupational therapy consults as needed   Assist and instruct patient to increase activity and self care as tolerated Problem: ABCDS Injury Assessment  Goal: Absence of physical injury  Outcome: Progressing     Problem: Skin/Tissue Integrity - Adult  Goal: Skin integrity remains intact  Outcome: Progressing  Flowsheets (Taken 3/7/2023 2102)  Skin Integrity Remains Intact:   Monitor for areas of redness and/or skin breakdown   Assess vascular access sites hourly  Goal: Incisions, wounds, or drain sites healing without S/S of infection  Outcome: Progressing  Flowsheets (Taken 3/7/2023 2102)  Incisions, Wounds, or Drain Sites Healing Without Sign and Symptoms of Infection: TWICE DAILY: Assess and document skin integrity     Problem: Gastrointestinal - Adult  Goal: Maintains or returns to baseline bowel function  Outcome: Progressing  Flowsheets (Taken 3/7/2023 2102)  Maintains or returns to baseline bowel function:   Assess bowel function   Encourage oral fluids to ensure adequate hydration   Administer ordered medications as needed   Encourage mobilization and activity

## 2023-03-08 NOTE — FLOWSHEET NOTE
Discharge instructions printed in packet to be sent to [facility] with prescriptions. PVL d/c'd & pt awaiting BLS ambulette transport. 3 attempts to call and give report to [facility] with no answer. If there is any questions please call [phone].

## 2023-03-08 NOTE — PROGRESS NOTES
Pt up to chair this AM with staff assistance and walker. Knee immobilizer in place. Patient without further complaints at this time. Patient educated on need to continue ambulation with knee immobilizer on per order. Patient unable to recall when last BM occurred, colace given per order.     Electronically signed by Noa Gomez, RN on 3/8/23 at 9:34 AM EST

## 2023-03-08 NOTE — CARE COORDINATION
Case Management Discharge Note          Date / Time of Note: 3/8/2023 11:02 AM                  Patient Name: Kali Parsons   YOB: 1950  Diagnosis: Leg pain [M79.606]  Fall, initial encounter [W19. XXXA]  Injury of right lower extremity, initial encounter Donato Poser   Date / Time: 3/3/2023  8:35 PM    Financial:  Payor: Leighann Glover / Plan: Fritz Narvaez PPO / Product Type: Medicare /      Pharmacy:    [facility] [address] [facility] [phone] - f [phone]  [address] [address]  Phone: [phone] Fax: [phone]      Assistance purchasing medications?: Potential Assistance Purchasing Medications: No  Assistance provided by Case Management: None at this time    [facility]    Nursing Facility:   Discharging to Facility/ Agency   Name: [facility]  Address:  [address] [place] [address], [address] [place] [address]   Phone:  [phone]  Fax:  [phone]    [facility] Completed: Yes          Discharging to Facility/ Agency   Name: [facility]  Address:  [address] [place] [address], [address] [place] [address]   Phone:  [phone]  Fax:  [phone]    Notification completed in HENS/PAS?:  Yes : CM has completed HENS online through secure website for SNF admission at [facility]. Document ID #: [id number]      Transportation:  Transportation PLAN for discharge: EMS transportation   Mode of Transport: Ambulance stretcher - BLS  Reason for medical transport: Moderate to Severe pain related to right distal quadricep rupture requires ambulance transport due to pain and inability to ambulate. Name of [address] [place] [address],[address]:  [facility]   Phone: [phone]  Time of Transport: 1415rTransport form completed: Yes    IMM Completed:   Yes, Case management has presented and reviewed IMM letter #2. Yoko Saxena    Patient and/or family/POA verbalized understanding of their medicare rights and appeal process if needed. Patient and/or family/POA has signed, initialed and placed the date and time on IMM letter #2 on the the appropriate lines. Copy of letter offered and they are aware that the original copy of IMM letter #2 is available prior to discharge from the paper chart on the unit. Electronic documentation has been entered into epic for IMM letter #2 and original paper copy has been added to the paper chart at the nurses station. Additional CM Notes: ERIC spoke with Brody Glover who will run a rapid COVID for this pt. SW notified Anastasiya at [facility] of the d/c plan and SW notified pt. The Plan for Transition of Care is related to the following treatment goals of Leg pain [M79.606]  Fall, initial encounter [W19. XXXA]  Injury of right lower extremity, initial encounter [S89.91XA]    The Patient and/or patient representative Shelly and her family were provided with a choice of provider and agrees with the discharge plan Yes    Freedom of choice list was provided with basic dialogue that supports the patient's individualized plan of care/goals and shares the quality data associated with the providers.  Yes    FRANCES Crawford CHARLY  [phone]  Electronically signed by David Godoy on 3/8/2023 at 11:22 AM

## 2023-03-08 NOTE — CARE COORDINATION
Discharge Planning:  ERIC spoke with Olena Bang from [facility]. Anastasiya confirmed authorization for skilled placement and reported this authorization is good through 3/10/23. Plan: [facility] upon d/c.   PRe cert obtained. [facility] is good through 3/10. HENS started.   Pt will require a pre cert within [id number] of d/c.  FRANCES Escobar CHARLY  [phone]  Electronically signed by Sam Burnham on 3/8/2023 at 10:42 AM

## 2023-03-08 NOTE — PROGRESS NOTES
Physical Therapy  Facility/Department: [facility]  Physical Therapy Treatment session  Name: Erica Rincon  : 1950  MRN: [id number]  Date of Service: 3/8/2023  Shelly ValdezNurysScar scored a 17/24 on the AM-PAC short mobility form. Current research shows that an AM-PAC score of 17 or less is typically not associated with a discharge to the patient's home setting. Based on the patient's AM-PAC score and their current functional mobility deficits, it is recommended that the patient have 3-5 sessions per week of Physical Therapy at d/c to increase the patient's independence. Please see assessment section for further patient specific details. If patient discharges prior to next session this note will serve as a discharge summary. Please see below for the latest assessment towards goals. Discharge Recommendations:   (3-5x wk (as insurance denied ARU))   PT Equipment Recommendations  Equipment Needed: No  Other: Defer to next level of care      Patient Diagnosis(es): The primary encounter diagnosis was Fall, initial encounter. Diagnoses of Injury of right lower extremity, initial encounter and Quadriceps tendon rupture, right, initial encounter were also pertinent to this visit. Past Medical History:  has a past medical history of Hyperlipidemia and Hypertension. Past Surgical History:  has a past surgical history that includes Leg Muscle Surgery (Right, 3/6/2023). Assessment   Assessment: The pt is a 73 y/o female who presents following a fall and distal quad tendon rupture. She is typically independent with mobility without AD and able to perform all ADLs, iADLs, gardens and is very active. She presents following surgical repair of tendon with knee immobilizer and WBAT R LE.  Pt. is improving with ambulation up to 120' with FWW but unasfe to return home at this time. ARU has been denied by insurance. Anticipate need for continued PT at d/c.   Treatment Diagnosis: impaired mobility 22 quad tendon rupture  Therapy Prognosis: Good  Activity Tolerance  Activity Tolerance: Patient tolerated treatment well  Activity Tolerance Comments: highly motivated     Plan   Physcial Therapy Plan  General Plan: 3-5 times per week  Current Treatment Recommendations: Strengthening, Balance training, Functional mobility training, Transfer training, Gait training, Neuromuscular re-education, Safety education & training, Patient/Caregiver education & training, Therapeutic activities  Safety Devices  Type of Devices: Chair alarm in place, Left in chair, Call light within reach, Nurse notified  Restraints  Restraints Initially in Place: No     Restrictions  Restrictions/Precautions  Restrictions/Precautions: Fall Risk, Weight Bearing  Required Braces or Orthoses?: Yes  Lower Extremity Weight Bearing Restrictions  Right Lower Extremity Weight Bearing: Weight Bearing As Tolerated  Required Braces or Orthoses  Right Lower Extremity Brace: Knee Immobilizer  Position Activity Restriction  Other position/activity restrictions: 1)  Up to bedside chair at least three times a day as tolerated. 2)  Ambulate in room progressing to hallway with assistive device four times daily (including PT) when PT advises. 3)  Bathroom privileges with assistance. FWBAT with Knee Immobilizer     Subjective   General  Chart Reviewed: Yes  Additional Pertinent Hx: The patient is a 74 y.o. female who sustained a traumatic tear of her distal quadriceps tendon of her right knee from a mechanical fall. She presents s/p Right distal quadriceps tendon tear repair on 3/6. Response To Previous Treatment: Patient with no complaints from previous session.   Family / Caregiver Present: No  Diagnosis: quad tendon rupture  Follows Commands: Within Functional Limits  Subjective  Subjective: Pt. very pleasant and upbeat, denies pain at rest and with ambulation         Social/Functional History  Social/Functional History  Lives With: Alone  Type of Home: House  Home Layout: One level (basement as well with walk in)  Home Access: Level entry (level through front)  Bathroom Shower/Tub: Tub/Shower unit  Bathroom Toilet: Handicap height  Bathroom Equipment: Grab bars in shower  Home Equipment:  (can borrow a cane if needed)  Has the patient had two or more falls in the past year or any fall with injury in the past year?: Yes (just this fall she is in for)  ADL Assistance: [address]: Independent  Homemaking Responsibilities: Yes  Ambulation Assistance: Independent  Transfer Assistance: Independent  Active : Yes  Occupation: Retired  Type of Occupation:   Leisure & Hobbies: gardening, bbq, decorating, shopping  Vision/Hearing       Cognition   Orientation  Overall Orientation Status: Within Functional Limits  Cognition  Overall Cognitive Status: WFL     Objective      Observation/Palpation  Posture: Good  Observation: knee immobilizer appropriately in place                       Bed mobility  Supine to Sit: Unable to assess  Sit to Supine: Unable to assess  Scooting: Unable to assess  Bed Mobility Comments: Up in BS chair pre/post session  Transfers  Sit to Stand: Contact guard assistance  Stand to Sit: Contact guard assistance  Comment: to RW with cueing for LE placement and hand placement, To scoot to the edge of chair, needed manual assist for the R LE  Ambulation  Surface: Level tile  Device: Rolling Walker  Other Apparatus: Knee Immobilizer;Right  Assistance: Contact guard assistance  Quality of Gait: slow aracelis, step to gait with antalgic of R LE, able to advance R LE with foot scuffing and without shoes on (in hospital socks)  Gait Deviations: Slow Aracelis;Decreased step length  Distance: 120' x 1  More Ambulation?: No  Stairs/Curb  Stairs?: No     Balance  Sitting - Static: Good  Sitting - Dynamic: Good  Standing - Static: Fair  Standing - Dynamic: Fair  Comments: Pt CGA with RW  Exercise Treatment: Sitting LE in recliner chair for R LE support:  APs x 15, glut sets x 10 B, Quad set L LE only x 10 - cues to not hold breath                                                        AM-PAC Score  AM-PAC Inpatient Mobility Raw Score : 17 (03/08/23 1112)  AM-PAC Inpatient T-Scale Score : 42.13 (03/08/23 1112)  Mobility Inpatient CMS 0-100% Score: 50.57 (03/08/23 1112)  Mobility Inpatient CMS G-Code Modifier : CK (03/08/23 1112)            Goals  Short Term Goals  Time Frame for Short Term Goals: By discharge - ongoing 3/8  Short Term Goal 1: The pt will perform bed mobility with supervision  Short Term Goal 2: The pt will transfer with supervision and RW  Short Term Goal 3: The pt will ambulate with RW x 50' and SBA  Patient Goals   Patient Goals :  To go home as soon as possible       Education  Patient Education  Education Given To: Patient  Education Provided: Role of Therapy;Plan of Care;Home Exercise Program  Education Method: Verbal  Barriers to Learning: None  Education Outcome: Verbalized understanding;Continued education needed      Therapy Time   Individual Concurrent Group Co-treatment   Time In 1045         Time Out 1112         Minutes 27         Timed Code Treatment Minutes: [address] [place], [place], [id number]

## 2023-03-23 ENCOUNTER — OFFICE VISIT (OUTPATIENT)
Dept: ORTHOPEDIC SURGERY | Age: 73
End: 2023-03-23
Payer: MEDICARE

## 2023-03-23 VITALS — HEIGHT: 61 IN | WEIGHT: 175 LBS | RESPIRATION RATE: 16 BRPM | BODY MASS INDEX: 33.04 KG/M2

## 2023-03-23 DIAGNOSIS — S76.111A QUADRICEPS TENDON RUPTURE, RIGHT, INITIAL ENCOUNTER: Primary | ICD-10-CM

## 2023-03-23 PROCEDURE — 99024 POSTOP FOLLOW-UP VISIT: CPT | Performed by: PHYSICIAN ASSISTANT

## 2023-03-23 PROCEDURE — L1832 KO ADJ JNT POS R SUP PRE CST: HCPCS | Performed by: PHYSICIAN ASSISTANT

## 2023-03-23 NOTE — PROGRESS NOTES
This dictation was done with [place] dictation and may contain mechanical errors related to translation. Resp. rate 16, height 5' 1\" (1.549 m), weight 175 lb (79.4 kg). This is a pleasant 26-year-old female who is here for first postop after quad repair on 3/6/2023. I was able to remove the immobilizer and the dressings. Dr. Gabriel Jimenez and I looked at the incision is healing nicely no signs of infection. Her quad tone is down her swelling in her knee is minimal and she is got good distal pulses good dorsiflexion plantarflexion strength and no neurologic deficits into the right foot. She was sent for x-rays including AP and lateral right knee. This showed the appropriate height of the patella on the AP and lateral.  No fractures or other bone abnormalities are noted and this was shown to the patient. Our impression is that she is stable healing on the quad tendon repair. We will transition her from the immobilizer to the ROM brace and we will initiate physical therapy in the next few weeks. She will follow-up with us in 4 weeks        Procedures    Breg T Scope Knee Brace     Patient was prescribed a Breg T-Scope Brace. The right knee will require stabilization / immobilization from this semi-rigid / rigid orthosis to improve their function. The orthosis will assist in protecting the affected area, provide functional support and facilitate healing. The prefabricated orthosis was modified in the following manner to provide a customizable fit for the patient at the time of delivery. 1.  Identification of appropriate positioning and alignment of anatomical landmarks. 2.  Trimming of straps and adjustment of frame to specifically fit patient. 3.  Polycentric hinge adjustment in flexion and extension. The patient was educated and fit by a healthcare professional with expert knowledge and specialization in brace application while under the direct supervision of the treating physician.   Verbal and

## 2023-04-20 ENCOUNTER — OFFICE VISIT (OUTPATIENT)
Dept: ORTHOPEDIC SURGERY | Age: 73
End: 2023-04-20

## 2023-04-20 VITALS — WEIGHT: 175 LBS | RESPIRATION RATE: 16 BRPM | BODY MASS INDEX: 33.04 KG/M2 | HEIGHT: 61 IN

## 2023-04-20 DIAGNOSIS — S76.111A QUADRICEPS TENDON RUPTURE, RIGHT, INITIAL ENCOUNTER: Primary | ICD-10-CM

## 2023-04-20 PROCEDURE — 99024 POSTOP FOLLOW-UP VISIT: CPT | Performed by: PHYSICIAN ASSISTANT

## 2023-04-21 NOTE — PROGRESS NOTES
This dictation was done with [place] [place] dictation and may contain mechanical errors related to translation. Resp. rate 16, height 5' 1\" (1.549 m), weight 175 lb (79.4 kg). This is a very pleasant 73-year-old female who is here after right quadricep tendon tear and then repair by Dr. Raudel Mcneal on 3/6/2023. She states she has 0 out of 10 pain she is wearing knee immobilizer and correctly is too low I did refit the whole brace for her so now its appropriate. The incisions healing nicely. There is no neurologic deficits distally.   She has approximately 0 to 6 degrees comfortably Dr. Raudel Mcneal wants to change her brace to 0-50 now and then we can increase to 0-90 in the end of 2 weeks this was written on a progress note and will her sheet for her to give to her therapist.    She can weight-bear as tolerated she can progressively move her leg while in the brace and we will see her in follow-up in 4 weeks

## 2023-05-18 ENCOUNTER — OFFICE VISIT (OUTPATIENT)
Dept: ORTHOPEDIC SURGERY | Age: 73
End: 2023-05-18

## 2023-05-18 DIAGNOSIS — S76.111A QUADRICEPS TENDON RUPTURE, RIGHT, INITIAL ENCOUNTER: Primary | ICD-10-CM

## 2023-05-18 PROCEDURE — 99024 POSTOP FOLLOW-UP VISIT: CPT | Performed by: PHYSICIAN ASSISTANT

## 2023-05-23 NOTE — PROGRESS NOTES
This dictation was done with [employer] dictation and may contain mechanical errors related to translation. There were no vitals taken for this visit. This is a very pleasant 18-year-old female who is a female for repair of her quad tendon on the right side. She is been immobilized in a brace and is making decent progress. She states in general she is 0-10 pain and is now able to do a straight leg raise without the brace. Incisions healed nicely can feel the quad tendon as she extends there is no deficits and the overall swelling soreness is appropriate at this stage.     Impression stable healing right quad tendon repair from 3/6/2023    So we talked about gradual discontinuing of the brace protected weightbearing to full weightbearing and continue maintenance exercises for flexibility and strength and she will follow-up with us as needed over the next 3 to 6 months
